# Patient Record
Sex: MALE | Race: WHITE | Employment: OTHER | URBAN - METROPOLITAN AREA
[De-identification: names, ages, dates, MRNs, and addresses within clinical notes are randomized per-mention and may not be internally consistent; named-entity substitution may affect disease eponyms.]

---

## 2018-05-15 ENCOUNTER — HOSPITAL ENCOUNTER (OUTPATIENT)
Dept: GENERAL RADIOLOGY | Age: 53
Discharge: HOME | End: 2018-05-15
Attending: PODIATRIST
Payer: MEDICARE

## 2018-05-15 DIAGNOSIS — S92.001K: ICD-10-CM

## 2018-05-15 PROCEDURE — 73610 X-RAY EXAM OF ANKLE: CPT

## 2019-06-28 ENCOUNTER — HOSPITAL ENCOUNTER (EMERGENCY)
Dept: GENERAL RADIOLOGY | Age: 54
Discharge: HOME | End: 2019-06-28
Attending: EMERGENCY MEDICINE
Payer: MEDICARE

## 2019-06-28 ENCOUNTER — HOSPITAL ENCOUNTER (EMERGENCY)
Age: 54
Discharge: HOME | End: 2019-06-28
Attending: STUDENT IN AN ORGANIZED HEALTH CARE EDUCATION/TRAINING PROGRAM
Payer: MEDICARE

## 2019-06-28 VITALS
HEART RATE: 110 BPM | DIASTOLIC BLOOD PRESSURE: 87 MMHG | SYSTOLIC BLOOD PRESSURE: 131 MMHG | RESPIRATION RATE: 20 BRPM | TEMPERATURE: 98.5 F

## 2019-06-28 DIAGNOSIS — M79.604 LEG PAIN, DIFFUSE, RIGHT: ICD-10-CM

## 2019-06-28 DIAGNOSIS — T14.8XXA ABRASION: Primary | ICD-10-CM

## 2019-06-28 PROCEDURE — 99281 EMR DPT VST MAYX REQ PHY/QHP: CPT

## 2019-06-28 PROCEDURE — 73562 X-RAY EXAM OF KNEE 3: CPT

## 2019-06-28 PROCEDURE — 73630 X-RAY EXAM OF FOOT: CPT

## 2019-06-28 PROCEDURE — 73502 X-RAY EXAM HIP UNI 2-3 VIEWS: CPT

## 2019-06-28 RX ORDER — IBUPROFEN 600 MG/1
600 TABLET ORAL
Status: DISCONTINUED | OUTPATIENT
Start: 2019-06-28 | End: 2019-06-28 | Stop reason: HOSPADM

## 2019-06-28 NOTE — DISCHARGE INSTRUCTIONS
Patient Education   Patient Education        Scrapes (Abrasions): Care Instructions  Your Care Instructions  Scrapes (abrasions) are wounds where your skin has been rubbed or torn off. Most scrapes do not go deep into the skin, but some may remove several layers of skin. Scrapes usually don't bleed much, but they may ooze pinkish fluid. Scrapes on the head or face may appear worse than they are. They may bleed a lot because of the good blood supply to this area. Most scrapes heal well and may not need a bandage. They usually heal within 3 to 7 days. A large, deep scrape may take 1 to 2 weeks or longer to heal. A scab may form on some scrapes. Follow-up care is a key part of your treatment and safety. Be sure to make and go to all appointments, and call your doctor if you are having problems. It's also a good idea to know your test results and keep a list of the medicines you take. How can you care for yourself at home? · If your doctor told you how to care for your wound, follow your doctor's instructions. If you did not get instructions, follow this general advice:  ? Wash the scrape with clean water 2 times a day. Don't use hydrogen peroxide or alcohol, which can slow healing. ? You may cover the scrape with a thin layer of petroleum jelly, such as Vaseline, and a nonstick bandage. ? Apply more petroleum jelly and replace the bandage as needed. · Prop up the injured area on a pillow anytime you sit or lie down during the next 3 days. Try to keep it above the level of your heart. This will help reduce swelling. · Be safe with medicines. Take pain medicines exactly as directed. ? If the doctor gave you a prescription medicine for pain, take it as prescribed. ? If you are not taking a prescription pain medicine, ask your doctor if you can take an over-the-counter medicine. When should you call for help?   Call your doctor now or seek immediate medical care if:    · You have signs of infection, such as:  ? Increased pain, swelling, warmth, or redness around the scrape. ? Red streaks leading from the scrape. ? Pus draining from the scrape. ? A fever.     · The scrape starts to bleed, and blood soaks through the bandage. Oozing small amounts of blood is normal.    Watch closely for changes in your health, and be sure to contact your doctor if the scrape is not getting better each day. Where can you learn more? Go to http://ronald-araceli.info/. Enter A374 in the search box to learn more about \"Scrapes (Abrasions): Care Instructions. \"  Current as of: September 23, 2018  Content Version: 11.9  © 4964-4310 [employer]. Care instructions adapted under license by [employer] (which disclaims liability or warranty for this information). If you have questions about a medical condition or this instruction, always ask your healthcare professional. [place] [address] any warranty or liability for your use of this information. Learning About Returning to Activity After Injury  What's important to know about injury recovery? Recovery from an injury takes time. Healing can take longer than you want it to. You may be tempted to return to your normal activities before you have fully healed. But stressing an injury makes it take even longer to heal. And you could make your injury worse than it's ever been. An injury heals fastest when you give it the rest and special care it needs. Your doctor can help you know when you're ready to ease back into normal activity. How can you help an injury heal?  You can speed up healing by avoiding movements that make it worse. It's also important to follow your doctor's instructions. · Ask your doctor if you can take an over-the-counter pain medicine, such as acetaminophen (Tylenol), ibuprofen (Advil, Motrin), or naproxen (Aleve). Be safe with medicines. Read and follow all instructions on the label.   · Put ice or a cold pack on the area for 10 to 20 minutes at a time to ease pain. Put a thin cloth between the ice and your skin. · If your doctor gave you a splint, a pair of crutches, or a sling, use it exactly as directed. Physical or occupational therapy can help you learn how to move in new ways and to recover from an injury. If you are given exercises to do, ease into them. Start each exercise slowly. Ease off an exercise if you start to have pain. When you have a routine of exercises, do them as often and as long as prescribed. While you heal, it also helps to keep the rest of your body moving. A physical therapist can suggest other exercises or ways of doing things to keep up your strength and energy. How do you return to activity? Slowly ease back into normal activity so you don't injure yourself again. A reinjury can be harder to heal than an original injury. If you are getting back into a sport, do it step by step. A  or physical therapist can help you do this safely. Start with short, easy movements or workouts. Then slowly add more over time. · Warm up before and stretch after the activity. · Stop what you're doing if it hurts. · When you're done, use ice to prevent pain and swelling. It may help to make some changes. For example, if a sport caused tendon pain, try another one for a while. If using a tool causes pain, change your  or use the other hand. When should you call for help? Watch closely for changes in your health, and be sure to contact your doctor if:  · Your symptoms are getting worse. · You have new symptoms, such as numbness or weakness. · You do not get better as expected. Follow-up care is a key part of your treatment and safety. Be sure to make and go to all appointments, and call your doctor if you are having problems. It's also a good idea to know your test results and keep a list of the medicines you take. Where can you learn more?   Go to http://ronald-araceli.info/. Enter B834 in the search box to learn more about \"Learning About Returning to Activity After Injury. \"  Current as of: September 20, 2018  Content Version: 11.9  © 5434-6835 [employer]. Care instructions adapted under license by [employer] (which disclaims liability or warranty for this information). If you have questions about a medical condition or this instruction, always ask your healthcare professional. [address] any warranty or liability for your use of this information.

## 2019-06-28 NOTE — ED TRIAGE NOTES
Had a fall today getting onto [place], he tripped at adult , has an abrasion to right knee then later at home refused to stand /bear weight. Patient is non-verbal, arrives with parents.

## 2019-06-28 NOTE — ED PROVIDER NOTES
62 y.o. male with no significant past medical history who presents from home with chief complaint of leg pain. Per relative, the pt was walking to go to an activity at school when he tripped getting into the [place] and fell onto his right knee. Pt has superficial scrapes to his right knee. He was then walking shortly after when he abruptly started refusing to ambulate. Relatives state that he now cries when they try to get him up to walk. They called his PCP who referred him to the ED for an x-ray. Pt had 2 Tylenol 1.5 hours ago. There are no other acute medical concerns at this time. Full history, physical exam, and ROS unable to be obtained due to:  nonverbal. 
 
PCP: Ellie Camarillo MD 
 
I have evaluated the patient as the Provider in Triage. I have reviewed His vital signs and the triage nurse assessment. I have talked with the patient and any available family and advised that I am the provider in triage and have ordered the appropriate study to initiate their work up based on the clinical presentation during my assessment. I have advised that the patient will be accommodated in the Main ED as soon as possible. I have also requested to contact the triage nurse or myself immediately if the patient experiences any changes in their condition during this brief waiting period. Note written by Rema Martínez, as dictated by Fritz Nichols MD 5:03 PM 
 
The history is provided by the patient and a parent. No  was used. No past medical history on file. No past surgical history on file. No family history on file. Social History Socioeconomic History  Marital status: SINGLE Spouse name: Not on file  Number of children: Not on file  Years of education: Not on file  Highest education level: Not on file Occupational History  Not on file Social Needs  Financial resource strain: Not on file  Food insecurity:  
  Worry: Not on file Inability: Not on file  Transportation needs:  
  Medical: Not on file Non-medical: Not on file Tobacco Use  Smoking status: Not on file Substance and Sexual Activity  Alcohol use: Not on file  Drug use: Not on file  Sexual activity: Not on file Lifestyle  Physical activity:  
  Days per week: Not on file Minutes per session: Not on file  Stress: Not on file Relationships  Social connections:  
  Talks on phone: Not on file Gets together: Not on file Attends Jain service: Not on file Active member of club or organization: Not on file Attends meetings of clubs or organizations: Not on file Relationship status: Not on file  Intimate partner violence:  
  Fear of current or ex partner: Not on file Emotionally abused: Not on file Physically abused: Not on file Forced sexual activity: Not on file Other Topics Concern  Not on file Social History Narrative  Not on file ALLERGIES: Patient has no allergy information on record. Review of Systems Unable to perform ROS: Patient nonverbal  
 
 
Vitals:  
 06/28/19 1705 BP: 131/87 Pulse: (!) 110 Resp: 20 Temp: 98.5 °F (36.9 °C) Physical Exam  
Constitutional: He is oriented to person, place, and time. He appears well-developed and well-nourished. No distress. NAD,  DD, non-verbal at baseline HENT:  
Head: Normocephalic and atraumatic. Mouth/Throat: Oropharynx is clear and moist. No oropharyngeal exudate. Eyes: Pupils are equal, round, and reactive to light. Conjunctivae and EOM are normal. Right eye exhibits no discharge. Left eye exhibits no discharge. Neck: Normal range of motion. Neck supple. Cardiovascular: Normal rate, regular rhythm, normal heart sounds and intact distal pulses. Exam reveals no gallop and no friction rub. No murmur heard.  
Pulmonary/Chest: Effort normal and breath sounds normal. No respiratory distress. He has no wheezes. He has no rales. He exhibits no tenderness. Abdominal: Soft. Bowel sounds are normal. He exhibits no distension and no mass. There is no tenderness. There is no rebound and no guarding. nttp Musculoskeletal: Normal range of motion. He exhibits no edema, tenderness or deformity. Abrasion to R knee noted Pt had central/ paraspinal C/T/L spines, Upper/Lower ext long bones, Abdomen,  Pelvis, hands /feet and all joints palpated and tolerated well (except as above) ; Pt has motor/ CV / Sensation grossly intact to all extremities; Lymphadenopathy:  
  He has no cervical adenopathy. Neurological: He is alert and oriented to person, place, and time. He displays normal reflexes. No cranial nerve deficit or sensory deficit. He exhibits normal muscle tone. Coordination normal.  
Skin: Skin is warm and dry. Capillary refill takes less than 2 seconds. No rash noted. No erythema. Nursing note and vitals reviewed. MDM Procedures 6:40 PM 
[facility] Getting  results have been reviewed with him. He has been counseled regarding his diagnosis. He verbally conveys understanding and agreement of the signs, symptoms, diagnosis, treatment and prognosis and additionally agrees to Call/ Arrange follow up as recommended with Dr. Aden Weller MD in 24 - 48 hours. He also agrees with the care-plan and conveys that all of his questions have been answered. I have also put together some discharge instructions for him that include: 1) educational information regarding their diagnosis, 2) how to care for their diagnosis at home, as well a 3) list of reasons why they would want to return to the ED prior to their follow-up appointment, should their condition change or for concerns.

## 2019-07-24 ENCOUNTER — HOSPITAL ENCOUNTER (OUTPATIENT)
Dept: MAMMOGRAPHY | Age: 54
Discharge: HOME | End: 2019-07-24
Attending: ORTHOPAEDIC SURGERY
Payer: MEDICARE

## 2019-07-24 ENCOUNTER — HOSPITAL ENCOUNTER (OUTPATIENT)
Dept: GENERAL RADIOLOGY | Age: 54
Discharge: HOME | End: 2019-07-24
Attending: ORTHOPAEDIC SURGERY
Payer: MEDICARE

## 2019-07-24 DIAGNOSIS — M54.50 LUMBAGO: ICD-10-CM

## 2019-07-24 DIAGNOSIS — M85.89 OTHER SPECIFIED DISORDERS OF BONE DENSITY AND STRUCTURE, MULTIPLE SITES: ICD-10-CM

## 2019-07-24 PROCEDURE — 72100 X-RAY EXAM L-S SPINE 2/3 VWS: CPT

## 2019-07-24 PROCEDURE — 77080 DXA BONE DENSITY AXIAL: CPT

## 2020-01-01 ENCOUNTER — APPOINTMENT (OUTPATIENT)
Dept: GENERAL RADIOLOGY | Age: 55
End: 2020-01-01
Attending: EMERGENCY MEDICINE
Payer: MEDICARE

## 2020-01-01 ENCOUNTER — HOSPITAL ENCOUNTER (EMERGENCY)
Age: 55
Discharge: HOME | End: 2020-10-15
Attending: EMERGENCY MEDICINE
Payer: MEDICARE

## 2020-01-01 ENCOUNTER — APPOINTMENT (OUTPATIENT)
Dept: CT IMAGING | Age: 55
End: 2020-01-01
Attending: EMERGENCY MEDICINE
Payer: MEDICARE

## 2020-01-01 ENCOUNTER — TRANSCRIBE ORDER (OUTPATIENT)
Dept: SCHEDULING | Age: 55
End: 2020-01-01

## 2020-01-01 ENCOUNTER — VIRTUAL VISIT (OUTPATIENT)
Dept: NEUROLOGY | Age: 55
End: 2020-01-01
Payer: MEDICARE

## 2020-01-01 ENCOUNTER — OFFICE VISIT (OUTPATIENT)
Dept: NEUROLOGY | Age: 55
End: 2020-01-01

## 2020-01-01 VITALS — TEMPERATURE: 97.9 F

## 2020-01-01 VITALS
OXYGEN SATURATION: 100 % | HEART RATE: 81 BPM | HEIGHT: 65 IN | BODY MASS INDEX: 19.66 KG/M2 | DIASTOLIC BLOOD PRESSURE: 73 MMHG | WEIGHT: 118 LBS | RESPIRATION RATE: 16 BRPM | SYSTOLIC BLOOD PRESSURE: 139 MMHG | TEMPERATURE: 98.4 F

## 2020-01-01 DIAGNOSIS — R41.82 ALTERED MENTAL STATUS, UNSPECIFIED ALTERED MENTAL STATUS TYPE: Primary | ICD-10-CM

## 2020-01-01 DIAGNOSIS — F03.90 DEMENTIA WITHOUT BEHAVIORAL DISTURBANCE, UNSPECIFIED DEMENTIA TYPE: ICD-10-CM

## 2020-01-01 DIAGNOSIS — F02.818 DEMENTIA IN ALZHEIMER'S DISEASE WITH EARLY ONSET WITH BEHAVIORAL DISTURBANCE (HCC): Primary | ICD-10-CM

## 2020-01-01 DIAGNOSIS — F81.89 DEVELOPMENTAL NON-VERBAL DISORDER: ICD-10-CM

## 2020-01-01 DIAGNOSIS — Q90.9 DOWN'S SYNDROME: ICD-10-CM

## 2020-01-01 DIAGNOSIS — R56.9 GENERALIZED-ONSET SEIZURES (HCC): Primary | ICD-10-CM

## 2020-01-01 DIAGNOSIS — G30.0 DEMENTIA IN ALZHEIMER'S DISEASE WITH EARLY ONSET WITH BEHAVIORAL DISTURBANCE (HCC): Primary | ICD-10-CM

## 2020-01-01 LAB
ALBUMIN SERPL-MCNC: 3.1 G/DL (ref 3.5–5)
ALBUMIN/GLOB SERPL: 0.7 {RATIO} (ref 1.1–2.2)
ALP SERPL-CCNC: 96 U/L (ref 45–117)
ALT SERPL-CCNC: 15 U/L (ref 12–78)
ANION GAP SERPL CALC-SCNC: 6 MMOL/L (ref 5–15)
APPEARANCE UR: CLEAR
AST SERPL-CCNC: 21 U/L (ref 15–37)
ATRIAL RATE: 468 BPM
BACTERIA SPEC CULT: NORMAL
BACTERIA URNS QL MICRO: NEGATIVE /HPF
BASOPHILS # BLD: 0 K/UL (ref 0–0.1)
BASOPHILS NFR BLD: 1 % (ref 0–1)
BILIRUB SERPL-MCNC: 0.5 MG/DL (ref 0.2–1)
BILIRUB UR QL CFM: NEGATIVE
BUN SERPL-MCNC: 14 MG/DL (ref 6–20)
BUN/CREAT SERPL: 15 (ref 12–20)
CALCIUM SERPL-MCNC: 8.5 MG/DL (ref 8.5–10.1)
CALCULATED R AXIS, ECG10: -2 DEGREES
CALCULATED T AXIS, ECG11: 17 DEGREES
CHLORIDE SERPL-SCNC: 107 MMOL/L (ref 97–108)
CO2 SERPL-SCNC: 28 MMOL/L (ref 21–32)
COLOR UR: ABNORMAL
COMMENT, HOLDF: NORMAL
CREAT SERPL-MCNC: 0.92 MG/DL (ref 0.7–1.3)
DIAGNOSIS, 93000: NORMAL
DIFFERENTIAL METHOD BLD: ABNORMAL
EOSINOPHIL # BLD: 0 K/UL (ref 0–0.4)
EOSINOPHIL NFR BLD: 0 % (ref 0–7)
EPITH CASTS URNS QL MICRO: ABNORMAL /LPF
ERYTHROCYTE [DISTWIDTH] IN BLOOD BY AUTOMATED COUNT: 13.5 % (ref 11.5–14.5)
GLOBULIN SER CALC-MCNC: 4.3 G/DL (ref 2–4)
GLUCOSE SERPL-MCNC: 92 MG/DL (ref 65–100)
GLUCOSE UR STRIP.AUTO-MCNC: NEGATIVE MG/DL
HCT VFR BLD AUTO: 40.8 % (ref 36.6–50.3)
HGB BLD-MCNC: 13.3 G/DL (ref 12.1–17)
HGB UR QL STRIP: NEGATIVE
HYALINE CASTS URNS QL MICRO: ABNORMAL /LPF (ref 0–5)
IMM GRANULOCYTES # BLD AUTO: 0 K/UL (ref 0–0.04)
IMM GRANULOCYTES NFR BLD AUTO: 0 % (ref 0–0.5)
KETONES UR QL STRIP.AUTO: 15 MG/DL
LACTATE BLD-SCNC: 1.21 MMOL/L (ref 0.4–2)
LEUKOCYTE ESTERASE UR QL STRIP.AUTO: NEGATIVE
LYMPHOCYTES # BLD: 2.6 K/UL (ref 0.8–3.5)
LYMPHOCYTES NFR BLD: 35 % (ref 12–49)
MCH RBC QN AUTO: 33.9 PG (ref 26–34)
MCHC RBC AUTO-ENTMCNC: 32.6 G/DL (ref 30–36.5)
MCV RBC AUTO: 104.1 FL (ref 80–99)
MONOCYTES # BLD: 0.6 K/UL (ref 0–1)
MONOCYTES NFR BLD: 8 % (ref 5–13)
NEUTS SEG # BLD: 4.2 K/UL (ref 1.8–8)
NEUTS SEG NFR BLD: 56 % (ref 32–75)
NITRITE UR QL STRIP.AUTO: NEGATIVE
NRBC # BLD: 0 K/UL (ref 0–0.01)
NRBC BLD-RTO: 0 PER 100 WBC
PH UR STRIP: 5.5 [PH] (ref 5–8)
PLATELET # BLD AUTO: 233 K/UL (ref 150–400)
PMV BLD AUTO: 10.8 FL (ref 8.9–12.9)
POTASSIUM SERPL-SCNC: 3.7 MMOL/L (ref 3.5–5.1)
PROCALCITONIN SERPL-MCNC: <0.05 NG/ML
PROT SERPL-MCNC: 7.4 G/DL (ref 6.4–8.2)
PROT UR STRIP-MCNC: NEGATIVE MG/DL
Q-T INTERVAL, ECG07: 378 MS
QRS DURATION, ECG06: 76 MS
QTC CALCULATION (BEZET), ECG08: 446 MS
RBC # BLD AUTO: 3.92 M/UL (ref 4.1–5.7)
RBC #/AREA URNS HPF: ABNORMAL /HPF (ref 0–5)
SAMPLES BEING HELD,HOLD: NORMAL
SERVICE CMNT-IMP: NORMAL
SODIUM SERPL-SCNC: 141 MMOL/L (ref 136–145)
SP GR UR REFRACTOMETRY: 1.03 (ref 1–1.03)
TROPONIN I SERPL-MCNC: <0.05 NG/ML
UA: UC IF INDICATED,UAUC: ABNORMAL
UR CULT HOLD, URHOLD: NORMAL
UROBILINOGEN UR QL STRIP.AUTO: 1 EU/DL (ref 0.2–1)
VENTRICULAR RATE, ECG03: 84 BPM
WBC # BLD AUTO: 7.4 K/UL (ref 4.1–11.1)
WBC URNS QL MICRO: ABNORMAL /HPF (ref 0–4)

## 2020-01-01 PROCEDURE — 90846 FAMILY PSYTX W/O PT 50 MIN: CPT | Performed by: CLINICAL NEUROPSYCHOLOGIST

## 2020-01-01 PROCEDURE — 71045 X-RAY EXAM CHEST 1 VIEW: CPT

## 2020-01-01 PROCEDURE — 99285 EMERGENCY DEPT VISIT HI MDM: CPT

## 2020-01-01 PROCEDURE — 80053 COMPREHEN METABOLIC PANEL: CPT

## 2020-01-01 PROCEDURE — 36415 COLL VENOUS BLD VENIPUNCTURE: CPT

## 2020-01-01 PROCEDURE — 84484 ASSAY OF TROPONIN QUANT: CPT

## 2020-01-01 PROCEDURE — 87040 BLOOD CULTURE FOR BACTERIA: CPT

## 2020-01-01 PROCEDURE — 84145 PROCALCITONIN (PCT): CPT

## 2020-01-01 PROCEDURE — 81001 URINALYSIS AUTO W/SCOPE: CPT

## 2020-01-01 PROCEDURE — 70450 CT HEAD/BRAIN W/O DYE: CPT

## 2020-01-01 PROCEDURE — 83605 ASSAY OF LACTIC ACID: CPT

## 2020-01-01 PROCEDURE — 74011250636 HC RX REV CODE- 250/636: Performed by: EMERGENCY MEDICINE

## 2020-01-01 PROCEDURE — 77030019905 HC CATH URETH INTMIT MDII -A

## 2020-01-01 PROCEDURE — 85025 COMPLETE CBC W/AUTO DIFF WBC: CPT

## 2020-01-01 PROCEDURE — 93005 ELECTROCARDIOGRAM TRACING: CPT

## 2020-01-01 RX ORDER — SODIUM CHLORIDE 0.9 % (FLUSH) 0.9 %
5-10 SYRINGE (ML) INJECTION AS NEEDED
Status: DISCONTINUED | OUTPATIENT
Start: 2020-01-01 | End: 2020-01-01 | Stop reason: HOSPADM

## 2020-01-01 RX ADMIN — SODIUM CHLORIDE 1000 ML: 900 INJECTION, SOLUTION INTRAVENOUS at 16:08

## 2020-04-07 ENCOUNTER — TELEPHONE (OUTPATIENT)
Dept: NEUROLOGY | Age: 55
End: 2020-04-07

## 2020-04-07 NOTE — TELEPHONE ENCOUNTER
----- Message from Danilo Shabazz sent at 4/7/2020  8:47 AM EDT -----  Regarding: /telephone  General Message/Vendor Calls    Caller's first and last name:      Reason for call: The pt's group home stated they would like the pt's NP appt rescheduled. The group home stated that they are willing to do a virtual appt. Callback required yes/no and why: yes      Best contact number(s): [phone].

## 2020-04-08 ENCOUNTER — OFFICE VISIT (OUTPATIENT)
Dept: NEUROLOGY | Age: 55
End: 2020-04-08

## 2020-04-08 DIAGNOSIS — G30.0 DEMENTIA IN ALZHEIMER'S DISEASE WITH EARLY ONSET WITH BEHAVIORAL DISTURBANCE (HCC): Primary | ICD-10-CM

## 2020-04-08 DIAGNOSIS — Q90.9 DOWN'S SYNDROME: ICD-10-CM

## 2020-04-08 DIAGNOSIS — F02.818 DEMENTIA IN ALZHEIMER'S DISEASE WITH EARLY ONSET WITH BEHAVIORAL DISTURBANCE (HCC): Primary | ICD-10-CM
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
## 2020-04-08 NOTE — LETTER
4/8/2020 11:35 AM 
 
Patient:  Moiz Ochoa YOB: 1965 Date of Visit: 4/8/2020 Dear Vito Belcher MD 
[address] [place] [id number] VIA Facsimile: [phone]: Thank you for referring Mr. Suzie Castellon to me for evaluation/treatment. I spoke with mother and  today and this is sounding quite a bit like dementia, which is common in this patient population. I would like to test him and mom would like to wait until the coronavirus issue settles a bit especially given the patient himself has just recovered from pneumonia. Please consider medication for memory in the interim and we will test him in approximately four to six weeks depending on the trend of the virus. I am still testing patients in the interim but do respect mother's wishes here, which make complete sense to me. If you have questions, please do not hesitate to call me. I look forward to following Mr. Merry Mckenzie along with you. Sincerely, Karol Dill PsyD

## 2020-07-23 NOTE — LETTER
7/24/20 Patient: Fiona John YOB: 1965 Date of Visit: 7/23/2020 Murali Cordero MD 
[address] [address] VIA Facsimile: [phone] Dear Murali Cordero MD, Thank you for referring Mr. Vasu Washington to [facility] for evaluation. My notes for this consultation are attached. If you have questions, please do not hesitate to call me. I look forward to following your patient along with you. Sincerely, Yanique Delgado PsyD
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
## 2020-09-08 NOTE — PROGRESS NOTES
This is a teleneuropsychology (audio/visual) visit that was performed with in the originating site at patient's home and the distance site at [facility] outpatient clinic at [place]. Verbal consent to participate in the video visit was obtained. This visit occurred during the corona (COVID -19) public health emergency and these visits were authorized by the President of the [place]. I discussed with the patient the nature of our teleneuropsych visit in that : 
 
- I would evaluate the patient and recommend diagnostics and treatment based on my assessment and impressions, and/or provided test results and discussed these issues with the patient and/or family. 
 
- Our sessions are not being recorded and that personal health information is protected - Our team will provide follow-up care in person if when the patient needs it. Prior to seeing the patient I reviewed the records, including the previously completed report, the records in [place], and any updated visits from other providers since I saw the patient last.   
 
Today, I engaged in a psychoeducational and supportive psychotherapy and feedback session with the patient via teleneuropsychology. I reviewed the results of the recent Neuropsychological Evaluation, including discussing individual tests as well as patient's areas of neurocognitive strength versus weakness. We discussed, in detail, the following: 
 
Testing limited by the patient's profound neurocognitive impairments. There is evidence of a decline in functioning from two separate sources and the prevalence of dementia is common in this patient population. From my observations and review, a dementia is quite likely. I do not have objective data with which to corroborate this, so this is a diagnosis of exclusion. Clinical correlation is strongly advised.   The patient should be encouraged to remain as mentally, socially, and physically active as possible.   
  
 The patient's generated cognitive difficulties are significant to the degree whereby it is my opinion that he should not live independently without appropriate supervision across virtually all ADLs, but especially those pertaining to memory. This includes nutritional/meal preparation supervision, medication management supervision, and supervision of financial dealings. I agree with his current placement. I do not find him competent to manage his own affairs. Follow up prn. Clinical correlation is,of course, indicated. Education was provided regarding my diagnostic impressions, and we discussed treatment plan/options. I also answered numerous questions related to the clinical findings, including discussing various methods to improve cognition and mood. Counseling provided regarding mood and cognition. CBT and supportive psychotherapy techniques were utilized. Supportive/Cognitive Behavioral/Solution Focused psychotherapy provided Discussed rational versus irrational thinking patterns and their consequences. Discussed healthy/adaptive and unhealthy/maladaptive coping. The patient needs to receive care for dementia and needs increased day-to-day care and support at home. Specific areas which were addressed include: progressive dementia, common in down's. He has had three seizures. Saw Dr. Shelia Joyce and last seizure was about 45 days ago. He is on seroquel. He is getting wors. e He doesn't recognize family. He will smile and look at the workers. It's getting worse. The patient had the following concerns which I deferred to their referring provider: meds Aricept caused seizures Time spent today:  45 in total 
 
Pursuant to the emergency declaration under the [facility], [facility] authority and the [employer] Act, this Virtual Visit (audio/visual) was conducted, with patient's consent, to reduce the patient's risk of exposure to COVID-19 and provide continuity of care. Services were provided in this manner to substitute for in-person clinic visit.

## 2020-10-15 NOTE — ED PROVIDER NOTES
Please note that this dictation was completed with [employer] ([employer]), the computer voice recognition software.  Quite often unanticipated grammatical, syntax, homophones, and other interpretive errors are inadvertently transcribed by the computer software.  Please disregard these errors.  Please excuse any errors that have escaped final proofreading. 40-year-old male past medical history remarkable for MR nonverbal at baseline, R collar bone fracture, dementia, and recent treatment with pneumonia 1 week ago presents by EMS from the group home for \"altered mental status. Patient's caregiver accompanies patient states for the past week he has been unable to feed himself which prior to last week he was able to do. They have also noticed changes in his activities his responsiveness. Today evidently at shift change they felt like he had changed more than during the previous week. He states normally he is up walking around which she has not been doing recently but today seemed worse. He denies any recent illnesses he had a negative Covid swab last week he has not been vomiting is not had diarrhea he is not been febrile he has not had any trauma. History is provided by the nurses assistant traveling with the patient. There have been no recet medication initiations/ changes/ discontinuations (save completed abx for CAP prescribed PCP/ completed 7 day course);  
 
assiatant denies recent HA, vison changes, diff swallowing, CP, SOB, Abd pain, F/Ch, N/V, D/Cons or other current systemic complaints other than above; No past medical history on file. No past medical history on file. No past surgical history on file. No family history on file. Social History Socioeconomic History  Marital status: SINGLE Spouse name: Not on file  Number of children: Not on file  Years of education: Not on file  Highest education level: Not on file Occupational History  Not on file Social Needs  Financial resource strain: Not on file  Food insecurity Worry: Not on file Inability: Not on file  Transportation needs Medical: Not on file Non-medical: Not on file Tobacco Use  Smoking status: Not on file Substance and Sexual Activity  Alcohol use: Not on file  Drug use: Not on file  Sexual activity: Not on file Lifestyle  Physical activity Days per week: Not on file Minutes per session: Not on file  Stress: Not on file Relationships  Social connections Talks on phone: Not on file Gets together: Not on file Attends Orthodoxy service: Not on file Active member of club or organization: Not on file Attends meetings of clubs or organizations: Not on file Relationship status: Not on file  Intimate partner violence Fear of current or ex partner: Not on file Emotionally abused: Not on file Physically abused: Not on file Forced sexual activity: Not on file Other Topics Concern  Not on file Social History Narrative  Not on file ALLERGIES: Reglan [metoclopramide hcl] Review of Systems Unable to perform ROS: Patient nonverbal  
All other systems reviewed and are negative. There were no vitals filed for this visit. Physical Exam 
Vitals signs and nursing note reviewed. Constitutional:   
   General: He is not in acute distress. Appearance: Normal appearance. He is well-developed. He is not ill-appearing, toxic-appearing or diaphoretic. Comments: NAD, at baseline mentally/ non-verbal; HENT:  
   Head: Normocephalic and atraumatic. Comments: Cn intact grossly Pt unable to fully participate Right Ear: External ear normal.  
   Left Ear: External ear normal.  
   Mouth/Throat:  
   Pharynx: No oropharyngeal exudate. Eyes:  
   General:     
   Right eye: No discharge. Left eye: No discharge.   
   Conjunctiva/sclera: Conjunctivae normal.  
 Pupils: Pupils are equal, round, and reactive to light. Neck: Musculoskeletal: Normal range of motion and neck supple. No muscular tenderness. Cardiovascular:  
   Rate and Rhythm: Normal rate and regular rhythm. Pulses: Normal pulses. Heart sounds: Normal heart sounds. No murmur. No friction rub. No gallop. Pulmonary:  
   Effort: Pulmonary effort is normal. No respiratory distress. Breath sounds: Normal breath sounds. No wheezing or rales. Chest:  
   Chest wall: No tenderness. Abdominal:  
   General: Bowel sounds are normal. There is no distension. Palpations: Abdomen is soft. There is no mass. Tenderness: There is no abdominal tenderness. There is no guarding or rebound. Musculoskeletal: Normal range of motion. General: No swelling, tenderness, deformity or signs of injury. Right lower leg: No edema. Left lower leg: No edema. Lymphadenopathy:  
   Cervical: No cervical adenopathy. Skin: 
   General: Skin is warm and dry. Capillary Refill: Capillary refill takes less than 2 seconds. Coloration: Skin is not jaundiced or pale. Findings: No bruising, erythema, lesion or rash. Neurological:  
   General: No focal deficit present. Mental Status: He is alert. Mental status is at baseline. Cranial Nerves: No cranial nerve deficit. Sensory: No sensory deficit. Motor: No weakness. Coordination: Coordination normal.  
   Gait: Gait normal.  
   Deep Tendon Reflexes: Reflexes normal.  
 
  
 
MDM Procedures No chief complaint on file. 3:34 PM 
The patients presenting problems have been discussed, and they are in agreement with the care plan formulated and outlined with them. I have encouraged them to ask questions as they arise throughout their visit. MEDICATIONS GIVEN: 
Medications - No data to display LABS REVIEWED: 
Labs Reviewed - No data to display RADIOLOGY RESULTS: 
 The following have been ordered and reviewed: 
_____________________________________________________________________ 
_____________________________________________________________________ EKG interpretation:  
Rhythm: normal sinus rhythm; and regular . Rate (approx.): 84; Axis: normal; P wave: normal; QRS interval: normal ; ST/T wave: normal; Negative acute significant segmental elevations/ no ols study PROCEDURES: 
 
 
 
CONSULTATIONS:  
 
 
PROGRESS NOTES: 
 
 
DIAGNOSIS: 
 
1. Altered mental status, unspecified altered mental status type 2. Dementia without behavioral disturbance, unspecified dementia type ([address] 3. Developmental non-verbal disorder PLAN: 
1-? Progression of dementia ED COURSE: The patients hospital course has been uncomplicated. 
 
4:49 PM Pt/ caregiver told of neg ed evaluation/ they agrtee w/ discharge home/ 'will see juan f Wilkinson';  
[employer]  results have been reviewed with him. He has been counseled regarding his diagnosis. He verbally conveys understanding and agreement of the signs, symptoms, diagnosis, treatment and prognosis and additionally agrees to Call/ Arrange follow up as recommended with Dr. Maria Antonia Olsen MD in 24 - 48 hours. He also agrees with the care-plan and conveys that all of his questions have been answered. I have also put together some discharge instructions for him that include: 1) educational information regarding their diagnosis, 2) how to care for their diagnosis at home, as well a 3) list of reasons why they would want to return to the ED prior to their follow-up appointment, should their condition change or for concerns.

## 2020-10-15 NOTE — ED NOTES
pts caregiver given d/c instructions and verbalized understanding to follow up with pcp. Pt to lobby via wheelchair

## 2020-10-15 NOTE — DISCHARGE INSTRUCTIONS
Patient Education        Dementia: Care Instructions  Your Care Instructions     Dementia is a loss of mental skills that affects your daily life. It is different than the occasional trouble with memory that is part of aging. You may find it hard to remember things that you feel you should be able to remember. Or you may feel that your mind is just not working as well as usual.  Finding out that you have dementia is a shock. You may be afraid and worried about how the condition will change your life. Although there is no cure at this time, medicine may slow memory loss and improve thinking for a while. Other medicines may be able to help you sleep or cope with depression and behavior changes. Dementia often gets worse slowly. But it can get worse quickly. As dementia gets worse, it may become harder to do common things that take planning, like making a list and going shopping. Over time, the disease may make it hard for you to take care of yourself. Some people with dementia need others to help care for them. Dementia is different for everyone. You may be able to function well for a long time. In the early stage of the condition, you can do things at home to make life easier and safer. You also can keep doing your hobbies and other activities. Many people find comfort in planning now for their future needs. Follow-up care is a key part of your treatment and safety. Be sure to make and go to all appointments, and call your doctor if you are having problems. It's also a good idea to know your test results and keep a list of the medicines you take. How can you care for yourself at home? · Take your medicines exactly as prescribed. Call your doctor if you think you are having a problem with your medicine. · Eat healthy foods. Eat lots of whole grains, fruits, and vegetables every day.  If you are not hungry, try snacks or nutritional drinks such as Boost, Ensure, or Sustacal.  · If you have problems sleeping:  ? Try not to nap too close to your bedtime. ? Exercise regularly. Walking is a good choice. ? Try a glass of warm milk or caffeine-free herbal tea before bed. · Do tasks and activities during the time of day when you feel your best. It may help to develop a daily routine. · Post labels, lists, and sticky notes to help you remember things. Write your activities on a calendar you can easily find. Put your clock where you can easily see it. · Stay active. Take walks in familiar places, or with friends or loved ones. Try to stay active mentally too. Read and work crossword puzzles if you enjoy these activities. · Do not drive unless you can pass an on-road driving test. If you are not sure if you are safe to drive, your state 's license bureau can test you. · Keep a cordless phone and a flashlight with new batteries by your bed. If possible, put a phone in each of the main rooms of your house, or carry a cell phone in case you fall and cannot reach a phone. Or, you can wear a device around your neck or wrist. You push a button that sends a signal for help. Acknowledge your emotions and plan for the future  · Talk openly and honestly with your doctor. · Let yourself grieve. It is common to feel angry, scared, frustrated, anxious, or depressed. · Get emotional support from family, friends, a support group, or a counselor experienced in working with people who have dementia. · Ask for help if you need it. · Tell your doctor how you feel. You may feel upset, angry, or worried at times. Many things can cause this, including poor sleep, medicine side effects, confusion, and pain. Your doctor may be able to help you. · Plan for the future. ? Talk to your family and doctor about preparing a living will and other important papers while you can make decisions. These papers tell your doctors how to care for you at the end of your life.   ? Consider naming a person to make decisions about your care if you are not able to. When should you call for help? Call 911 anytime you think you may need emergency care. For example, call if:    · You are lost and do not know whom to call.     · You are injured and do not know whom to call. Call your doctor now or seek immediate medical care if:    · You are more confused or upset than usual.     · You feel like you could hurt yourself because your mind is not working well. Watch closely for changes in your health, and be sure to contact your doctor if you have any problems. Where can you learn more? Go to http://www.gray.com/  Enter E814 in the search box to learn more about \"Dementia: Care Instructions. \"  Current as of: January 31, 2020               Content Version: 12.6  © 7520-8543 [employer]. Care instructions adapted under license by [employer] (which disclaims liability or warranty for this information). If you have questions about a medical condition or this instruction, always ask your healthcare professional. [place] [address] any warranty or liability for your use of this information. Patient Education        Altered Mental Status: Care Instructions  Your Care Instructions     Altered mental status is a change in how well your brain is working. As a result, you may be confused, be less alert than usual, or act in odd ways. This may include seeing or hearing things that aren't really there (hallucinations). A mental status change has many possible causes. For example, it may be the result of an infection, an imbalance of chemicals in the body, or a chronic disease such as diabetes or COPD. It can also be caused by things such as a head injury, taking certain medicines, or using alcohol or drugs. The doctor may do tests to look for the cause. These tests may include urine tests, blood tests, and imaging tests such as a CT scan. Sometimes a clear cause isn't found.  But tests can help the doctor rule out a serious cause of your symptoms. A change in mental status can be scary. But mental status will often return to normal when the cause is treated. So it is important to get any follow-up testing or treatment the doctor has suggested. The doctor has checked you carefully, but problems can develop later. If you notice any problems or new symptoms, get medical treatment right away. Follow-up care is a key part of your treatment and safety. Be sure to make and go to all appointments, and call your doctor if you are having problems. It's also a good idea to know your test results and keep a list of the medicines you take. How can you care for yourself at home? · Be safe with medicines. Take your medicines exactly as prescribed. Call your doctor if you think you are having a problem with your medicine. · Have another adult stay with you until you are better. This can help keep you safe. Ask that person to watch for signs that your mental status is getting worse. When should you call for help? Call 911 anytime you think you may need emergency care. For example, call if:    · You passed out (lost consciousness). Call your doctor now or seek immediate medical care if:    · Your mental status is getting worse.     · You have new symptoms, such as a fever, chills, or shortness of breath.     · You do not feel safe. Watch closely for changes in your health, and be sure to contact your doctor if:    · You do not get better as expected. Where can you learn more? Go to http://www.StyleTread.com/  Enter J452 in the search box to learn more about \"Altered Mental Status: Care Instructions. \"  Current as of: November 20, 2019               Content Version: 12.6  © 4497-6916 [employer]. Care instructions adapted under license by [employer] (which disclaims liability or warranty for this information).  If you have questions about a medical condition or this instruction, always ask your healthcare professional. [place] [address] any warranty or liability for your use of this information.

## 2020-10-15 NOTE — ED TRIAGE NOTES
Pt arrives via EMS from [facility] for AMS. EMS states that at around 1400 they noticed pt not acting like himself. Pt dx'd with PNA last week but recovered. (-) covid last week. Pt is non verbal at baseline but normally follows commands and acknowledges when name is called. Pt is agitated for EMS and pulling at IV.  Will not follow commands on arrival.

## 2021-01-01 ENCOUNTER — HOSPITAL ENCOUNTER (INPATIENT)
Age: 56
LOS: 1 days | Discharge: HOSPICE | DRG: 871 | End: 2021-05-05
Attending: EMERGENCY MEDICINE | Admitting: INTERNAL MEDICINE
Payer: MEDICARE

## 2021-01-01 ENCOUNTER — APPOINTMENT (OUTPATIENT)
Dept: GENERAL RADIOLOGY | Age: 56
End: 2021-01-01
Attending: EMERGENCY MEDICINE
Payer: MEDICARE

## 2021-01-01 ENCOUNTER — HOSPITAL ENCOUNTER (EMERGENCY)
Age: 56
Discharge: HOME | End: 2021-02-06
Attending: EMERGENCY MEDICINE | Admitting: EMERGENCY MEDICINE
Payer: MEDICARE

## 2021-01-01 ENCOUNTER — APPOINTMENT (OUTPATIENT)
Dept: CT IMAGING | Age: 56
DRG: 871 | End: 2021-01-01
Attending: EMERGENCY MEDICINE
Payer: MEDICARE

## 2021-01-01 ENCOUNTER — HOSPICE ADMISSION (OUTPATIENT)
Dept: HOSPICE | Facility: HOSPICE | Age: 56
End: 2021-01-01
Payer: MEDICARE

## 2021-01-01 ENCOUNTER — APPOINTMENT (OUTPATIENT)
Dept: GENERAL RADIOLOGY | Age: 56
DRG: 871 | End: 2021-01-01
Attending: EMERGENCY MEDICINE
Payer: MEDICARE

## 2021-01-01 ENCOUNTER — HOSPITAL ENCOUNTER (INPATIENT)
Age: 56
LOS: 1 days | DRG: 951 | End: 2021-05-05
Attending: INTERNAL MEDICINE | Admitting: INTERNAL MEDICINE
Payer: OTHER MISCELLANEOUS

## 2021-01-01 VITALS
HEART RATE: 93 BPM | SYSTOLIC BLOOD PRESSURE: 124 MMHG | OXYGEN SATURATION: 100 % | BODY MASS INDEX: 19.66 KG/M2 | RESPIRATION RATE: 18 BRPM | TEMPERATURE: 97.8 F | HEIGHT: 65 IN | WEIGHT: 118 LBS | DIASTOLIC BLOOD PRESSURE: 69 MMHG

## 2021-01-01 VITALS
TEMPERATURE: 99.7 F | OXYGEN SATURATION: 91 % | HEART RATE: 77 BPM | RESPIRATION RATE: 12 BRPM | WEIGHT: 123.2 LBS | SYSTOLIC BLOOD PRESSURE: 93 MMHG | DIASTOLIC BLOOD PRESSURE: 57 MMHG | BODY MASS INDEX: 20.5 KG/M2

## 2021-01-01 DIAGNOSIS — R62.7 FTT (FAILURE TO THRIVE) IN ADULT: ICD-10-CM

## 2021-01-01 DIAGNOSIS — G30.9 ALZHEIMER'S DEMENTIA WITHOUT BEHAVIORAL DISTURBANCE, UNSPECIFIED TIMING OF DEMENTIA ONSET: ICD-10-CM

## 2021-01-01 DIAGNOSIS — Z66 DNR (DO NOT RESUSCITATE): ICD-10-CM

## 2021-01-01 DIAGNOSIS — F02.80 ALZHEIMER'S DEMENTIA WITHOUT BEHAVIORAL DISTURBANCE, UNSPECIFIED TIMING OF DEMENTIA ONSET: ICD-10-CM

## 2021-01-01 DIAGNOSIS — R06.4 LABORED BREATHING: ICD-10-CM

## 2021-01-01 DIAGNOSIS — Z71.1 CONCERN ABOUT END OF LIFE: ICD-10-CM

## 2021-01-01 DIAGNOSIS — K11.7 INCREASED OROPHARYNGEAL SECRETIONS: ICD-10-CM

## 2021-01-01 DIAGNOSIS — G30.9 ALZHEIMER'S DEMENTIA WITH BEHAVIORAL DISTURBANCE, UNSPECIFIED TIMING OF DEMENTIA ONSET: ICD-10-CM

## 2021-01-01 DIAGNOSIS — F02.81 ALZHEIMER'S DEMENTIA WITH BEHAVIORAL DISTURBANCE, UNSPECIFIED TIMING OF DEMENTIA ONSET: ICD-10-CM

## 2021-01-01 DIAGNOSIS — Z71.89 GOALS OF CARE, COUNSELING/DISCUSSION: ICD-10-CM

## 2021-01-01 DIAGNOSIS — R41.82 ALTERED MENTAL STATUS, UNSPECIFIED ALTERED MENTAL STATUS TYPE: ICD-10-CM

## 2021-01-01 DIAGNOSIS — Q90.9 DOWN'S SYNDROME: ICD-10-CM

## 2021-01-01 DIAGNOSIS — Z51.5 HOSPICE CARE PATIENT: ICD-10-CM

## 2021-01-01 DIAGNOSIS — Z71.89 DNR (DO NOT RESUSCITATE) DISCUSSION: ICD-10-CM

## 2021-01-01 DIAGNOSIS — R56.9 SEIZURE (HCC): Primary | ICD-10-CM

## 2021-01-01 DIAGNOSIS — R41.89 DECREASED RESPONSIVENESS: ICD-10-CM

## 2021-01-01 DIAGNOSIS — R45.1 RESTLESSNESS AND AGITATION: ICD-10-CM

## 2021-01-01 DIAGNOSIS — Z71.89 ADVANCED CARE PLANNING/COUNSELING DISCUSSION: ICD-10-CM

## 2021-01-01 DIAGNOSIS — J18.9 PNEUMONIA OF RIGHT LUNG DUE TO INFECTIOUS ORGANISM, UNSPECIFIED PART OF LUNG: Primary | ICD-10-CM

## 2021-01-01 DIAGNOSIS — G40.909 SEIZURE DISORDER (HCC): ICD-10-CM

## 2021-01-01 DIAGNOSIS — R52 DIFFUSE PAIN: ICD-10-CM

## 2021-01-01 DIAGNOSIS — R09.02 HYPOXIA: ICD-10-CM

## 2021-01-01 DIAGNOSIS — F41.9 ANXIETY: ICD-10-CM

## 2021-01-01 LAB
ALBUMIN SERPL-MCNC: 3.2 G/DL (ref 3.5–5)
ALBUMIN SERPL-MCNC: 3.5 G/DL (ref 3.5–5)
ALBUMIN/GLOB SERPL: 0.8 {RATIO} (ref 1.1–2.2)
ALBUMIN/GLOB SERPL: 0.9 {RATIO} (ref 1.1–2.2)
ALP SERPL-CCNC: 112 U/L (ref 45–117)
ALP SERPL-CCNC: 89 U/L (ref 45–117)
ALT SERPL-CCNC: 17 U/L (ref 12–78)
ALT SERPL-CCNC: 19 U/L (ref 12–78)
ANION GAP SERPL CALC-SCNC: 5 MMOL/L (ref 5–15)
ANION GAP SERPL CALC-SCNC: 6 MMOL/L (ref 5–15)
APPEARANCE UR: CLEAR
AST SERPL-CCNC: 14 U/L (ref 15–37)
AST SERPL-CCNC: 20 U/L (ref 15–37)
ATRIAL RATE: 102 BPM
ATRIAL RATE: 74 BPM
BACTERIA SPEC CULT: NORMAL
BACTERIA SPEC CULT: NORMAL
BACTERIA URNS QL MICRO: NEGATIVE /HPF
BASOPHILS # BLD: 0 K/UL (ref 0–0.1)
BASOPHILS # BLD: 0.1 K/UL (ref 0–0.1)
BASOPHILS NFR BLD: 0 % (ref 0–1)
BASOPHILS NFR BLD: 1 % (ref 0–1)
BILIRUB SERPL-MCNC: 0.2 MG/DL (ref 0.2–1)
BILIRUB SERPL-MCNC: 0.4 MG/DL (ref 0.2–1)
BILIRUB UR QL: NEGATIVE
BUN SERPL-MCNC: 18 MG/DL (ref 6–20)
BUN SERPL-MCNC: 23 MG/DL (ref 6–20)
BUN/CREAT SERPL: 19 (ref 12–20)
BUN/CREAT SERPL: 19 (ref 12–20)
CALCIUM SERPL-MCNC: 8.8 MG/DL (ref 8.5–10.1)
CALCIUM SERPL-MCNC: 8.8 MG/DL (ref 8.5–10.1)
CALCULATED P AXIS, ECG09: 43 DEGREES
CALCULATED P AXIS, ECG09: 44 DEGREES
CALCULATED R AXIS, ECG10: 20 DEGREES
CALCULATED R AXIS, ECG10: 31 DEGREES
CALCULATED T AXIS, ECG11: 35 DEGREES
CALCULATED T AXIS, ECG11: 48 DEGREES
CHLORIDE SERPL-SCNC: 105 MMOL/L (ref 97–108)
CHLORIDE SERPL-SCNC: 108 MMOL/L (ref 97–108)
CO2 SERPL-SCNC: 28 MMOL/L (ref 21–32)
CO2 SERPL-SCNC: 30 MMOL/L (ref 21–32)
COLOR UR: ABNORMAL
COMMENT, HOLDF: NORMAL
COMMENT, HOLDF: NORMAL
COVID-19 RAPID TEST, COVR: NOT DETECTED
CREAT SERPL-MCNC: 0.96 MG/DL (ref 0.7–1.3)
CREAT SERPL-MCNC: 1.21 MG/DL (ref 0.7–1.3)
DIAGNOSIS, 93000: NORMAL
DIAGNOSIS, 93000: NORMAL
DIFFERENTIAL METHOD BLD: ABNORMAL
DIFFERENTIAL METHOD BLD: ABNORMAL
EOSINOPHIL # BLD: 0 K/UL (ref 0–0.4)
EOSINOPHIL # BLD: 0 K/UL (ref 0–0.4)
EOSINOPHIL NFR BLD: 0 % (ref 0–7)
EOSINOPHIL NFR BLD: 0 % (ref 0–7)
EPITH CASTS URNS QL MICRO: ABNORMAL /LPF
ERYTHROCYTE [DISTWIDTH] IN BLOOD BY AUTOMATED COUNT: 13.5 % (ref 11.5–14.5)
ERYTHROCYTE [DISTWIDTH] IN BLOOD BY AUTOMATED COUNT: 13.7 % (ref 11.5–14.5)
GLOBULIN SER CALC-MCNC: 3.4 G/DL (ref 2–4)
GLOBULIN SER CALC-MCNC: 4.4 G/DL (ref 2–4)
GLUCOSE SERPL-MCNC: 151 MG/DL (ref 65–100)
GLUCOSE SERPL-MCNC: 96 MG/DL (ref 65–100)
GLUCOSE UR STRIP.AUTO-MCNC: NEGATIVE MG/DL
HCT VFR BLD AUTO: 41.1 % (ref 36.6–50.3)
HCT VFR BLD AUTO: 44.5 % (ref 36.6–50.3)
HGB BLD-MCNC: 13.9 G/DL (ref 12.1–17)
HGB BLD-MCNC: 14.9 G/DL (ref 12.1–17)
HGB UR QL STRIP: ABNORMAL
IMM GRANULOCYTES # BLD AUTO: 0 K/UL (ref 0–0.04)
IMM GRANULOCYTES # BLD AUTO: 0.1 K/UL (ref 0–0.04)
IMM GRANULOCYTES NFR BLD AUTO: 0 % (ref 0–0.5)
IMM GRANULOCYTES NFR BLD AUTO: 1 % (ref 0–0.5)
KETONES UR QL STRIP.AUTO: NEGATIVE MG/DL
LACTATE BLD-SCNC: 0.75 MMOL/L (ref 0.4–2)
LACTATE BLD-SCNC: 0.8 MMOL/L (ref 0.4–2)
LACTATE BLD-SCNC: 2.55 MMOL/L (ref 0.4–2)
LEUKOCYTE ESTERASE UR QL STRIP.AUTO: NEGATIVE
LYMPHOCYTES # BLD: 2.4 K/UL (ref 0.8–3.5)
LYMPHOCYTES # BLD: 3.1 K/UL (ref 0.8–3.5)
LYMPHOCYTES NFR BLD: 27 % (ref 12–49)
LYMPHOCYTES NFR BLD: 35 % (ref 12–49)
MCH RBC QN AUTO: 34.7 PG (ref 26–34)
MCH RBC QN AUTO: 34.8 PG (ref 26–34)
MCHC RBC AUTO-ENTMCNC: 33.5 G/DL (ref 30–36.5)
MCHC RBC AUTO-ENTMCNC: 33.8 G/DL (ref 30–36.5)
MCV RBC AUTO: 102.8 FL (ref 80–99)
MCV RBC AUTO: 103.7 FL (ref 80–99)
MONOCYTES # BLD: 0.6 K/UL (ref 0–1)
MONOCYTES # BLD: 0.8 K/UL (ref 0–1)
MONOCYTES NFR BLD: 7 % (ref 5–13)
MONOCYTES NFR BLD: 9 % (ref 5–13)
NEUTS SEG # BLD: 5 K/UL (ref 1.8–8)
NEUTS SEG # BLD: 5.6 K/UL (ref 1.8–8)
NEUTS SEG NFR BLD: 57 % (ref 32–75)
NEUTS SEG NFR BLD: 63 % (ref 32–75)
NITRITE UR QL STRIP.AUTO: NEGATIVE
NRBC # BLD: 0 K/UL (ref 0–0.01)
NRBC # BLD: 0 K/UL (ref 0–0.01)
NRBC BLD-RTO: 0 PER 100 WBC
NRBC BLD-RTO: 0 PER 100 WBC
P-R INTERVAL, ECG05: 126 MS
P-R INTERVAL, ECG05: 136 MS
PH UR STRIP: 5.5 [PH] (ref 5–8)
PLATELET # BLD AUTO: 213 K/UL (ref 150–400)
PLATELET # BLD AUTO: 224 K/UL (ref 150–400)
PMV BLD AUTO: 9.8 FL (ref 8.9–12.9)
PMV BLD AUTO: 9.9 FL (ref 8.9–12.9)
POTASSIUM SERPL-SCNC: 3.7 MMOL/L (ref 3.5–5.1)
POTASSIUM SERPL-SCNC: 3.9 MMOL/L (ref 3.5–5.1)
PROT SERPL-MCNC: 6.6 G/DL (ref 6.4–8.2)
PROT SERPL-MCNC: 7.9 G/DL (ref 6.4–8.2)
PROT UR STRIP-MCNC: NEGATIVE MG/DL
Q-T INTERVAL, ECG07: 358 MS
Q-T INTERVAL, ECG07: 398 MS
QRS DURATION, ECG06: 72 MS
QRS DURATION, ECG06: 74 MS
QTC CALCULATION (BEZET), ECG08: 441 MS
QTC CALCULATION (BEZET), ECG08: 466 MS
RBC # BLD AUTO: 4 M/UL (ref 4.1–5.7)
RBC # BLD AUTO: 4.29 M/UL (ref 4.1–5.7)
RBC #/AREA URNS HPF: ABNORMAL /HPF (ref 0–5)
SAMPLES BEING HELD,HOLD: NORMAL
SAMPLES BEING HELD,HOLD: NORMAL
SARS-COV-2, COV2: NORMAL
SERVICE CMNT-IMP: NORMAL
SERVICE CMNT-IMP: NORMAL
SODIUM SERPL-SCNC: 141 MMOL/L (ref 136–145)
SODIUM SERPL-SCNC: 141 MMOL/L (ref 136–145)
SOURCE, COVRS: NORMAL
SP GR UR REFRACTOMETRY: <1.005 (ref 1–1.03)
TROPONIN I SERPL-MCNC: <0.05 NG/ML
TROPONIN I SERPL-MCNC: <0.05 NG/ML
UA: UC IF INDICATED,UAUC: ABNORMAL
UROBILINOGEN UR QL STRIP.AUTO: 0.2 EU/DL (ref 0.2–1)
VENTRICULAR RATE, ECG03: 102 BPM
VENTRICULAR RATE, ECG03: 74 BPM
WBC # BLD AUTO: 8.9 K/UL (ref 4.1–11.1)
WBC # BLD AUTO: 8.9 K/UL (ref 4.1–11.1)
WBC URNS QL MICRO: ABNORMAL /HPF (ref 0–4)

## 2021-01-01 PROCEDURE — 85025 COMPLETE CBC W/AUTO DIFF WBC: CPT

## 2021-01-01 PROCEDURE — 81001 URINALYSIS AUTO W/SCOPE: CPT

## 2021-01-01 PROCEDURE — 74011250637 HC RX REV CODE- 250/637: Performed by: EMERGENCY MEDICINE

## 2021-01-01 PROCEDURE — 84484 ASSAY OF TROPONIN QUANT: CPT

## 2021-01-01 PROCEDURE — 96361 HYDRATE IV INFUSION ADD-ON: CPT

## 2021-01-01 PROCEDURE — 74011250636 HC RX REV CODE- 250/636: Performed by: NURSE PRACTITIONER

## 2021-01-01 PROCEDURE — 99218 HC RM OBSERVATION: CPT

## 2021-01-01 PROCEDURE — 80053 COMPREHEN METABOLIC PANEL: CPT

## 2021-01-01 PROCEDURE — 87040 BLOOD CULTURE FOR BACTERIA: CPT

## 2021-01-01 PROCEDURE — 94760 N-INVAS EAR/PLS OXIMETRY 1: CPT

## 2021-01-01 PROCEDURE — 93005 ELECTROCARDIOGRAM TRACING: CPT

## 2021-01-01 PROCEDURE — 65270000029 HC RM PRIVATE

## 2021-01-01 PROCEDURE — 71045 X-RAY EXAM CHEST 1 VIEW: CPT

## 2021-01-01 PROCEDURE — 74011250636 HC RX REV CODE- 250/636: Performed by: EMERGENCY MEDICINE

## 2021-01-01 PROCEDURE — 99223 1ST HOSP IP/OBS HIGH 75: CPT | Performed by: INTERNAL MEDICINE

## 2021-01-01 PROCEDURE — 99285 EMERGENCY DEPT VISIT HI MDM: CPT

## 2021-01-01 PROCEDURE — 96372 THER/PROPH/DIAG INJ SC/IM: CPT

## 2021-01-01 PROCEDURE — 36415 COLL VENOUS BLD VENIPUNCTURE: CPT

## 2021-01-01 PROCEDURE — 77030040922 HC BLNKT HYPOTHRM STRY -A

## 2021-01-01 PROCEDURE — 74011000258 HC RX REV CODE- 258: Performed by: INTERNAL MEDICINE

## 2021-01-01 PROCEDURE — 77010033678 HC OXYGEN DAILY

## 2021-01-01 PROCEDURE — 74011250636 HC RX REV CODE- 250/636: Performed by: INTERNAL MEDICINE

## 2021-01-01 PROCEDURE — 96374 THER/PROPH/DIAG INJ IV PUSH: CPT

## 2021-01-01 PROCEDURE — 83605 ASSAY OF LACTIC ACID: CPT

## 2021-01-01 PROCEDURE — 0656 HSPC GENERAL INPATIENT

## 2021-01-01 PROCEDURE — 87635 SARS-COV-2 COVID-19 AMP PRB: CPT

## 2021-01-01 PROCEDURE — 3336500001 HSPC ELECTION

## 2021-01-01 PROCEDURE — 77030027138 HC INCENT SPIROMETER -A

## 2021-01-01 PROCEDURE — 71250 CT THORAX DX C-: CPT

## 2021-01-01 PROCEDURE — 74011000250 HC RX REV CODE- 250: Performed by: INTERNAL MEDICINE

## 2021-01-01 PROCEDURE — 99223 1ST HOSP IP/OBS HIGH 75: CPT | Performed by: NURSE PRACTITIONER

## 2021-01-01 PROCEDURE — 70450 CT HEAD/BRAIN W/O DYE: CPT

## 2021-01-01 RX ORDER — LAMOTRIGINE 25 MG/1
50 TABLET, ORALLY DISINTEGRATING ORAL EVERY EVENING
Status: DISCONTINUED | OUTPATIENT
Start: 2021-01-01 | End: 2021-01-01

## 2021-01-01 RX ORDER — KETOROLAC TROMETHAMINE 30 MG/ML
30 INJECTION, SOLUTION INTRAMUSCULAR; INTRAVENOUS
Status: DISCONTINUED | OUTPATIENT
Start: 2021-01-01 | End: 2021-01-01 | Stop reason: HOSPADM

## 2021-01-01 RX ORDER — LAMOTRIGINE 25 MG/1
25 TABLET ORAL DAILY
Status: DISCONTINUED | OUTPATIENT
Start: 2021-01-01 | End: 2021-01-01

## 2021-01-01 RX ORDER — ZINC SULFATE 50(220)MG
220 CAPSULE ORAL DAILY
Qty: 30 CAP | Refills: 0 | Status: SHIPPED | OUTPATIENT
Start: 2021-01-01 | End: 2021-01-01

## 2021-01-01 RX ORDER — GLYCOPYRROLATE 0.2 MG/ML
0.2 INJECTION INTRAMUSCULAR; INTRAVENOUS EVERY 4 HOURS
Status: DISCONTINUED | OUTPATIENT
Start: 2021-01-01 | End: 2021-05-06 | Stop reason: HOSPADM

## 2021-01-01 RX ORDER — CHOLECALCIFEROL (VITAMIN D3) 125 MCG
2000 CAPSULE ORAL DAILY
COMMUNITY
End: 2021-01-01

## 2021-01-01 RX ORDER — LORAZEPAM 1 MG/1
1 TABLET ORAL
COMMUNITY

## 2021-01-01 RX ORDER — KETOROLAC TROMETHAMINE 30 MG/ML
30 INJECTION, SOLUTION INTRAMUSCULAR; INTRAVENOUS
Status: DISCONTINUED | OUTPATIENT
Start: 2021-01-01 | End: 2021-05-06 | Stop reason: HOSPADM

## 2021-01-01 RX ORDER — HALOPERIDOL 5 MG/ML
0.5 INJECTION INTRAMUSCULAR
Status: DISCONTINUED | OUTPATIENT
Start: 2021-01-01 | End: 2021-05-06 | Stop reason: HOSPADM

## 2021-01-01 RX ORDER — ACETAMINOPHEN 325 MG/1
650 TABLET ORAL
Status: DISCONTINUED | OUTPATIENT
Start: 2021-01-01 | End: 2021-01-01 | Stop reason: HOSPADM

## 2021-01-01 RX ORDER — MORPHINE SULFATE 2 MG/ML
1 INJECTION, SOLUTION INTRAMUSCULAR; INTRAVENOUS
Status: DISCONTINUED | OUTPATIENT
Start: 2021-01-01 | End: 2021-01-01 | Stop reason: HOSPADM

## 2021-01-01 RX ORDER — LORAZEPAM 2 MG/ML
1 INJECTION INTRAMUSCULAR
Status: COMPLETED | OUTPATIENT
Start: 2021-01-01 | End: 2021-01-01

## 2021-01-01 RX ORDER — OMEPRAZOLE 20 MG/1
20 CAPSULE, DELAYED RELEASE ORAL 2 TIMES DAILY
COMMUNITY

## 2021-01-01 RX ORDER — LAMOTRIGINE 50 MG/1
50 TABLET, ORALLY DISINTEGRATING ORAL
COMMUNITY

## 2021-01-01 RX ORDER — LAMOTRIGINE 25 MG/1
25 TABLET ORAL DAILY
COMMUNITY

## 2021-01-01 RX ORDER — SODIUM CHLORIDE 0.9 % (FLUSH) 0.9 %
5-10 SYRINGE (ML) INJECTION AS NEEDED
Status: DISCONTINUED | OUTPATIENT
Start: 2021-01-01 | End: 2021-01-01 | Stop reason: HOSPADM

## 2021-01-01 RX ORDER — LORAZEPAM 2 MG/ML
1 INJECTION INTRAMUSCULAR
Status: DISCONTINUED | OUTPATIENT
Start: 2021-01-01 | End: 2021-01-01 | Stop reason: HOSPADM

## 2021-01-01 RX ORDER — SODIUM CHLORIDE 0.9 % (FLUSH) 0.9 %
5-40 SYRINGE (ML) INJECTION AS NEEDED
Status: DISCONTINUED | OUTPATIENT
Start: 2021-01-01 | End: 2021-01-01 | Stop reason: HOSPADM

## 2021-01-01 RX ORDER — LORAZEPAM 2 MG/ML
1 INJECTION INTRAMUSCULAR
Status: DISCONTINUED | OUTPATIENT
Start: 2021-01-01 | End: 2021-01-01

## 2021-01-01 RX ORDER — SODIUM CHLORIDE 0.9 % (FLUSH) 0.9 %
5-40 SYRINGE (ML) INJECTION EVERY 8 HOURS
Status: DISCONTINUED | OUTPATIENT
Start: 2021-01-01 | End: 2021-01-01

## 2021-01-01 RX ORDER — ALBUTEROL SULFATE 90 UG/1
2 AEROSOL, METERED RESPIRATORY (INHALATION) ONCE
Status: COMPLETED | OUTPATIENT
Start: 2021-01-01 | End: 2021-01-01

## 2021-01-01 RX ORDER — DOXYCYCLINE HYCLATE 100 MG
100 TABLET ORAL 2 TIMES DAILY
Qty: 20 TAB | Refills: 0 | Status: SHIPPED | OUTPATIENT
Start: 2021-01-01 | End: 2021-01-01

## 2021-01-01 RX ORDER — LORAZEPAM 2 MG/ML
1 INJECTION INTRAMUSCULAR EVERY 8 HOURS
Status: DISCONTINUED | OUTPATIENT
Start: 2021-01-01 | End: 2021-01-01 | Stop reason: HOSPADM

## 2021-01-01 RX ORDER — ASCORBIC ACID 500 MG
500 TABLET ORAL 2 TIMES DAILY
Qty: 60 TAB | Refills: 0 | Status: SHIPPED | OUTPATIENT
Start: 2021-01-01 | End: 2021-01-01

## 2021-01-01 RX ORDER — GLYCOPYRROLATE 0.2 MG/ML
0.2 INJECTION INTRAMUSCULAR; INTRAVENOUS
Status: DISCONTINUED | OUTPATIENT
Start: 2021-01-01 | End: 2021-01-01 | Stop reason: HOSPADM

## 2021-01-01 RX ORDER — LORAZEPAM 2 MG/ML
1 INJECTION INTRAMUSCULAR
Status: DISCONTINUED | OUTPATIENT
Start: 2021-01-01 | End: 2021-05-06 | Stop reason: HOSPADM

## 2021-01-01 RX ORDER — ACETAMINOPHEN 650 MG/1
650 SUPPOSITORY RECTAL
Status: DISCONTINUED | OUTPATIENT
Start: 2021-01-01 | End: 2021-01-01 | Stop reason: HOSPADM

## 2021-01-01 RX ORDER — ONDANSETRON 2 MG/ML
4 INJECTION INTRAMUSCULAR; INTRAVENOUS
Status: DISCONTINUED | OUTPATIENT
Start: 2021-01-01 | End: 2021-01-01 | Stop reason: HOSPADM

## 2021-01-01 RX ORDER — LORAZEPAM 2 MG/ML
2 INJECTION INTRAMUSCULAR
Status: DISCONTINUED | OUTPATIENT
Start: 2021-01-01 | End: 2021-05-06 | Stop reason: HOSPADM

## 2021-01-01 RX ORDER — ONDANSETRON 2 MG/ML
4 INJECTION INTRAMUSCULAR; INTRAVENOUS
Status: DISCONTINUED | OUTPATIENT
Start: 2021-01-01 | End: 2021-05-06 | Stop reason: HOSPADM

## 2021-01-01 RX ORDER — LORAZEPAM 2 MG/ML
1 INJECTION INTRAMUSCULAR EVERY 4 HOURS
Status: DISCONTINUED | OUTPATIENT
Start: 2021-01-01 | End: 2021-05-06 | Stop reason: HOSPADM

## 2021-01-01 RX ORDER — POLYETHYLENE GLYCOL 3350 17 G/17G
17 POWDER, FOR SOLUTION ORAL DAILY PRN
Status: DISCONTINUED | OUTPATIENT
Start: 2021-01-01 | End: 2021-01-01

## 2021-01-01 RX ORDER — LEVOTHYROXINE SODIUM 50 UG/1
50 TABLET ORAL DAILY
COMMUNITY
End: 2021-01-01

## 2021-01-01 RX ORDER — MORPHINE SULFATE 2 MG/ML
2 INJECTION, SOLUTION INTRAMUSCULAR; INTRAVENOUS
Status: DISCONTINUED | OUTPATIENT
Start: 2021-01-01 | End: 2021-05-06 | Stop reason: HOSPADM

## 2021-01-01 RX ORDER — ENOXAPARIN SODIUM 100 MG/ML
40 INJECTION SUBCUTANEOUS DAILY
Status: DISCONTINUED | OUTPATIENT
Start: 2021-01-01 | End: 2021-01-01

## 2021-01-01 RX ORDER — PROMETHAZINE HYDROCHLORIDE 25 MG/1
12.5 TABLET ORAL
Status: DISCONTINUED | OUTPATIENT
Start: 2021-01-01 | End: 2021-01-01

## 2021-01-01 RX ORDER — MORPHINE SULFATE 2 MG/ML
2 INJECTION, SOLUTION INTRAMUSCULAR; INTRAVENOUS EVERY 4 HOURS
Status: DISCONTINUED | OUTPATIENT
Start: 2021-01-01 | End: 2021-05-06 | Stop reason: HOSPADM

## 2021-01-01 RX ORDER — FACIAL-BODY WIPES
10 EACH TOPICAL DAILY PRN
Status: DISCONTINUED | OUTPATIENT
Start: 2021-01-01 | End: 2021-05-06 | Stop reason: HOSPADM

## 2021-01-01 RX ORDER — LORAZEPAM 2 MG/ML
2 INJECTION INTRAMUSCULAR
Status: DISCONTINUED | OUTPATIENT
Start: 2021-01-01 | End: 2021-01-01 | Stop reason: HOSPADM

## 2021-01-01 RX ADMIN — LORAZEPAM 2 MG: 2 INJECTION INTRAMUSCULAR; INTRAVENOUS at 10:48

## 2021-01-01 RX ADMIN — PIPERACILLIN AND TAZOBACTAM 3.38 G: 3; .375 INJECTION, POWDER, LYOPHILIZED, FOR SOLUTION INTRAVENOUS at 11:07

## 2021-01-01 RX ADMIN — LORAZEPAM 1 MG: 2 INJECTION INTRAMUSCULAR; INTRAVENOUS at 13:18

## 2021-01-01 RX ADMIN — SODIUM CHLORIDE 1000 ML: 9 INJECTION, SOLUTION INTRAVENOUS at 14:35

## 2021-01-01 RX ADMIN — LORAZEPAM 1 MG: 2 INJECTION INTRAMUSCULAR; INTRAVENOUS at 05:13

## 2021-01-01 RX ADMIN — LORAZEPAM 1 MG: 2 INJECTION INTRAMUSCULAR; INTRAVENOUS at 20:31

## 2021-01-01 RX ADMIN — GLYCOPYRROLATE 0.2 MG: 0.2 INJECTION INTRAMUSCULAR; INTRAVENOUS at 20:31

## 2021-01-01 RX ADMIN — LORAZEPAM 1 MG: 2 INJECTION INTRAMUSCULAR; INTRAVENOUS at 21:08

## 2021-01-01 RX ADMIN — LORAZEPAM 1 MG: 2 INJECTION INTRAMUSCULAR; INTRAVENOUS at 15:49

## 2021-01-01 RX ADMIN — MORPHINE SULFATE 2 MG: 2 INJECTION, SOLUTION INTRAMUSCULAR; INTRAVENOUS at 20:32

## 2021-01-01 RX ADMIN — MORPHINE SULFATE 2 MG: 2 INJECTION, SOLUTION INTRAMUSCULAR; INTRAVENOUS at 16:27

## 2021-01-01 RX ADMIN — LORAZEPAM 1 MG: 2 INJECTION INTRAMUSCULAR; INTRAVENOUS at 02:40

## 2021-01-01 RX ADMIN — GLYCOPYRROLATE 0.2 MG: 0.2 INJECTION INTRAMUSCULAR; INTRAVENOUS at 13:15

## 2021-01-01 RX ADMIN — GLYCOPYRROLATE 0.2 MG: 0.2 INJECTION INTRAMUSCULAR; INTRAVENOUS at 15:49

## 2021-01-01 RX ADMIN — ENOXAPARIN SODIUM 40 MG: 40 INJECTION SUBCUTANEOUS at 08:31

## 2021-01-01 RX ADMIN — SODIUM CHLORIDE 1000 ML: 9 INJECTION, SOLUTION INTRAVENOUS at 02:38

## 2021-01-01 RX ADMIN — ALBUTEROL SULFATE 2 PUFF: 108 INHALANT RESPIRATORY (INHALATION) at 18:43

## 2021-01-01 RX ADMIN — LORAZEPAM 1 MG: 2 INJECTION INTRAMUSCULAR; INTRAVENOUS at 14:48

## 2021-02-06 NOTE — ED PROVIDER NOTES
The history is provided by a caregiver. The history is limited by a developmental delay. No  was used. Nasal Congestion This is a new problem. The current episode started 2 days ago. The problem occurs constantly. The problem has not changed since onset. No past medical history on file. No past surgical history on file. No family history on file. Social History Socioeconomic History  Marital status: SINGLE Spouse name: Not on file  Number of children: Not on file  Years of education: Not on file  Highest education level: Not on file Occupational History  Not on file Social Needs  Financial resource strain: Not on file  Food insecurity Worry: Not on file Inability: Not on file  Transportation needs Medical: Not on file Non-medical: Not on file Tobacco Use  Smoking status: Not on file Substance and Sexual Activity  Alcohol use: Not on file  Drug use: Not on file  Sexual activity: Not on file Lifestyle  Physical activity Days per week: Not on file Minutes per session: Not on file  Stress: Not on file Relationships  Social connections Talks on phone: Not on file Gets together: Not on file Attends Faith service: Not on file Active member of club or organization: Not on file Attends meetings of clubs or organizations: Not on file Relationship status: Not on file  Intimate partner violence Fear of current or ex partner: Not on file Emotionally abused: Not on file Physically abused: Not on file Forced sexual activity: Not on file Other Topics Concern  Not on file Social History Narrative  Not on file ALLERGIES: Aricept [donepezil] and Reglan [metoclopramide hcl] Review of Systems Unable to perform ROS: Patient nonverbal  
 
 
Vitals:  
 02/06/21 1316 Pulse: (!) 106 Resp: 18 Temp: 97.8 °F (36.6 °C) SpO2: (!) 88% Weight: 53.5 kg (118 lb) Height: 5' 5\" (1.651 m) Physical Exam 
Vitals signs and nursing note reviewed. Constitutional:   
   General: He is not in acute distress. Appearance: He is well-developed. He is not diaphoretic. HENT:  
   Head: Normocephalic and atraumatic. Nose: Congestion present. Eyes:  
   Pupils: Pupils are equal, round, and reactive to light. Neck: Musculoskeletal: Normal range of motion and neck supple. Cardiovascular:  
   Rate and Rhythm: Normal rate and regular rhythm. Heart sounds: Normal heart sounds. No murmur. No friction rub. No gallop. Pulmonary:  
   Effort: Pulmonary effort is normal. No respiratory distress. Breath sounds: Normal breath sounds. Decreased air movement present. No wheezing. Abdominal:  
   General: Bowel sounds are normal. There is no distension. Palpations: Abdomen is soft. Tenderness: There is no abdominal tenderness. There is no guarding or rebound. Musculoskeletal: Normal range of motion. Skin: 
   General: Skin is warm. Findings: No rash. Neurological:  
   Mental Status: He is alert.   
 
  
 
MDM 
  
 This is a 15-year-old male with past medical history, review of systems, physical exam as above, presenting with complaints of congestion and upper respiratory infection. Caregiver states unknown time. Likely several days of URI-like symptoms, \"feels warm\" in his group home. History of early onset Alzheimer's disease, patient's nonverbal at baseline. Caregiver denies coronavirus outbreak at the group home. She states they were at primary care physician's office earlier today where the patient was noted to be hypoxic, with decreased breath sounds on the left. Caregiver states that patient has a history of previous pneumonia. Physical exam remarkable for chronically ill-appearing middle-aged male, in no acute distress, noted to be satting 97% on room air, with diminished breath sounds throughout, regular rate and rhythm without murmurs gallops or rubs, copious nasal congestion, soft nontender abdomen. Differential includes pneumonia, viral URI, nasal congestion. Given reports of tachycardia and hypoxia will begin sepsis work-up with fluid resuscitation, chest x-ray, Covid swab. We will reassess, and make a disposition. Procedures SIGN OUT: 
3:00 PM 
Discussed pt's hx, disposition, and available diagnostic and imaging results with Dr. Susannah Diaz. Reviewed care plans. Both providers and patient are in agreement with care plan. Dr. Deandre Jacobo is transferring care of the pt to Dr. Susannah Diaz at this time.

## 2021-02-06 NOTE — ED NOTES
Report received from [facility], [address] [place] [address]. Assumed care of pt at this time

## 2021-02-06 NOTE — ED TRIAGE NOTES
Pt arrives with caretaker Araceli Salguero) for further evaluation of suspected pneumonia. Pt was seen at PCP office and referred here for xray. Pt is non-verbal; SPO2 sats in triage was 88%. PMH of pneumonia.

## 2021-02-06 NOTE — PROGRESS NOTES
5:40 PM 
Change of shift. Care of patient taken over from Dr Karyle Dare; H&P reviewed, bedside handoff complete. Awaiting imaging and blood work. Hypoxia noted in triage which was not noted in the room. Plan is for discharge home and pt looks well. Likely covid and will treat accordingly. Results noted. Will treat with abx for pna and pulse ox provided to patient. Will dc and strict RTER precautions provided.   
 
Suzanna Almendarez MD

## 2021-05-04 PROBLEM — R56.9 SEIZURE (HCC): Status: ACTIVE | Noted: 2021-01-01

## 2021-05-04 PROBLEM — A41.9 SEPSIS (HCC): Status: ACTIVE | Noted: 2021-01-01

## 2021-05-04 PROBLEM — T68.XXXA HYPOTHERMIA: Status: ACTIVE | Noted: 2021-01-01

## 2021-05-04 PROBLEM — R00.1 BRADYCARDIA: Status: ACTIVE | Noted: 2021-01-01

## 2021-05-04 PROBLEM — R57.9 SHOCK (HCC): Status: ACTIVE | Noted: 2021-01-01

## 2021-05-04 NOTE — HOSPICE
[facility]  [phone]     Patient Name: Kyaw Nuñez  YOB: 1965  Age: 60 y.o. [facility] RN Note:  Hospice consult received, reviewing chart. Will follow up with Unit Nurse and Care Manager to discuss plan of care, patient status and discharge disposition    Patient seen in the ED for GIP evaluation, patient unresponsive but comfortable. O2 via nasal cannula at 2L. Breathing unlabored, even. RR12-14  No audible secretions noted. No signs of pain, agitation or restlessness. BP on  Monitor 93/50, HR 64, sp02 97%  Currently on trace hugger. No seizure activity noted. D/W case w/ MD Dr Mario Ricardo. At this time, patient does not meet criteria for inpatient hospice     Patient is appropriate for hospice under routine LOC at previous residence. Per chart review, patient has been admitted to the medical team under Dr. Regla Davis. Should patient remain stable and plan to return to group home w/ hospice,  has noted that group home would like [facility] to serve. Given patient's history and recent seizure activity PTA, will continue to follow and reassess tomorrow AM for changes that would warrant inpatient hospice admission. Thank you for the opportunity to be of service to this patient.

## 2021-05-04 NOTE — ED PROVIDER NOTES
Please note that this dictation was completed with [employer], the computer voice recognition software.  Quite often unanticipated grammatical, syntax, homophones, and other interpretive errors are inadvertently transcribed by the computer software.  Please disregard these errors.  Please excuse any errors that have escaped final proofreading. 61-year-old male past medical history markable for developmental delay (MR) dementia presents by EMS from the group home for \"he had a seizure earlier today and then we found him and he seems altered. EMS reports that the group home was very unclear as to what exactly the altered mental status was and what the patient's normal baseline is. Per EMS they said \"on a good day he is up and walking around but he has not been doing that for a while. \"  Per the group home he has not been sick recently does have a history of seizures. They were unclear as to exactly how long he had been like this or when his last known normal time was. EMS reports blood glucose was 137 in route; No past medical history on file. No past surgical history on file. No family history on file.     Social History     Socioeconomic History    Marital status: SINGLE     Spouse name: Not on file    Number of children: Not on file    Years of education: Not on file    Highest education level: Not on file   Occupational History    Not on file   Social Needs    Financial resource strain: Not on file    Food insecurity     Worry: Not on file     Inability: Not on file    Transportation needs     Medical: Not on file     Non-medical: Not on file   Tobacco Use    Smoking status: Not on file   Substance and Sexual Activity    Alcohol use: Not on file    Drug use: Not on file    Sexual activity: Not on file   Lifestyle    Physical activity     Days per week: Not on file     Minutes per session: Not on file    Stress: Not on file   Relationships    Social connections     Talks on phone: Not on file     Gets together: Not on file     Attends [employer] service: Not on file     Active member of club or organization: Not on file     Attends meetings of clubs or organizations: Not on file     Relationship status: Not on file    Intimate partner violence     Fear of current or ex partner: Not on file     Emotionally abused: Not on file     Physically abused: Not on file     Forced sexual activity: Not on file   Other Topics Concern    Not on file   Social History Narrative    Not on file         ALLERGIES: Aricept [donepezil] and Reglan [metoclopramide hcl]    Review of Systems   Unable to perform ROS: Dementia   All other systems reviewed and are negative. Vitals:    05/04/21 0214   BP: 117/64   Pulse: 77   Resp: 20   Temp: 97.5 °F (36.4 °C)   SpO2: 96%   Weight: 55.9 kg (123 lb 3.2 oz)            Physical Exam  Constitutional:       General: He is not in acute distress. Appearance: Normal appearance. He is normal weight. He is not ill-appearing, toxic-appearing or diaphoretic. Comments: On NC   HENT:      Head: Normocephalic and atraumatic. Comments: Gag intact         Right Ear: External ear normal.      Left Ear: External ear normal.      Nose: Nose normal.      Mouth/Throat:      Mouth: Mucous membranes are moist.      Pharynx: No oropharyngeal exudate or posterior oropharyngeal erythema. Eyes:      General:         Right eye: No discharge. Left eye: No discharge. Pupils: Pupils are equal, round, and reactive to light. Comments: L eye medially deviated;    Neck:      Musculoskeletal: Normal range of motion. No muscular tenderness. Cardiovascular:      Rate and Rhythm: Normal rate and regular rhythm. Pulses: Normal pulses. Heart sounds: Normal heart sounds. No murmur. No gallop. Pulmonary:      Effort: Pulmonary effort is normal. No respiratory distress. Breath sounds: Normal breath sounds. No stridor. No wheezing, rhonchi or rales. Chest:      Chest wall: No tenderness. Genitourinary:     Comments: In diaper    NEMG/ circumcised   Musculoskeletal:         General: No deformity or signs of injury. Comments: Pt had central/ paraspinal C/T/L spines, Upper/Lower ext long bones, Abdomen,  Pelvis, bilateral snuff boxes/ bilateral snuff boxes/ hands /feet and all joints palpated and tolerated well (except as above) ; Pt has motor/ CV / Sensation grossly intact to all extremities;   Skin:     Capillary Refill: Capillary refill takes less than 2 seconds. Coloration: Skin is not jaundiced. Findings: No erythema or rash. Neurological:      Mental Status: Mental status is at baseline. Coordination: Coordination abnormal.          MDM  Number of Diagnoses or Management Options  Risk of Complications, Morbidity, and/or Mortality  Presenting problems: high  Diagnostic procedures: moderate  Management options: moderate  General comments: Total critical care time spent exclusive of procedures:  55 min    Patient Progress  Patient progress: improved         Procedures    Chief Complaint   Patient presents with    Unresponsive       2:19 AM  The patients presenting problems have been discussed, and they are in agreement with the care plan formulated and outlined with them. I have encouraged them to ask questions as they arise throughout their visit.     MEDICATIONS GIVEN:  Medications   sodium chloride (NS) flush 5-10 mL (has no administration in time range)   sodium chloride 0.9 % bolus infusion 1,000 mL (has no administration in time range)       LABS REVIEWED:  Labs Reviewed   CULTURE, BLOOD   CULTURE, BLOOD   URINALYSIS W/ REFLEX CULTURE   METABOLIC PANEL, COMPREHENSIVE   CBC WITH AUTOMATED DIFF   TROPONIN I       RADIOLOGY RESULTS:  The following have been ordered and reviewed:  _____________________________________________________________________  _____________________________________________________________________    EKG interpretation:   Rhythm: normal sinus rhythm; and regular . Rate (approx.): 74; Axis: normal; P wave: normal; QRS interval: normal ; ST/T wave: normal; Negative acute significant segmental elevations/ compared to study dated 02/06/2021    PROCEDURES:        CONSULTATIONS:       PROGRESS NOTES:      DIAGNOSIS:    1. Seizure ([facility]    2. Alzheimer's dementia with behavioral disturbance, unspecified timing of dementia onset ([facility]    3. DNR (do not resuscitate)        PLAN:  1-admit and monitor; Neurology evaluation for seizure medication      ED COURSE: The patients hospital course has been uncomplicated. 2:19 AM  Called spoke with Román Lan (mother) [phone]; 'He has not been sick/ does have seizures, on a seizure medicine (unknown)';       2:24 AM  Spoke with / 'Pt is DNR/ takes lorazepam for seizures/ had a seizure at 10 pm';      4:52 AM  Discussed results w/ Aide;     Perfect Serve Consult for Admission  5:19 AM    ED Room Number: ER06/06  Patient Name and age:  Luther Brown 60 y.o.  male  Working Diagnosis:   1. Seizure ([facility]    2. Alzheimer's dementia with behavioral disturbance, unspecified [facility] of [facility] ([facility]    3.  DNR (do not resuscitate)        COVID-19 Suspicion:  no  Sepsis present:  no  Reassessment needed: yes  Code Status:  Do Not Resuscitate  Readmission: no  Isolation Requirements:  no  Recommended Level of Care:  telemetry  Department:[facility] ED - [phone]  Other:  Seizure this evening/ sent for further evaluation from group home;  demented at baseline

## 2021-05-04 NOTE — PROGRESS NOTES
Shift Change:    Bedside and Verbal shift change report given to MEGHAN Thompson (oncoming nurse) by Arely Garcia RN (offgoing nurse). Report included the following information SBAR, Kardex, Intake/Output, MAR, Recent Results and Cardiac Rhythm Sinus urban. 0900: Unable to obtain oral or axillary temp. Patient's rectal temp 95.6. Patient skin cold to touch and pale. He is shaking, but stops when holding his arms. Covered patient with several warm blankets. Notified Dr. Gemma Smith. Message read but no response. 7146: Trace hugger applied on patient. Dr. Gemma Smith rounding     1010: Dr. Gemma Smith aware patient is hypotensive and bradycardic. No new orders at this time    1025: BP 78/48. Notified Dr. Gemma Smith again. Also made Dr. Gemma Smith aware that PO Lamictal was held due to patient only responds to noxious stimuli. 1100: Able to obtain axillary temp, 96.7. Since applying trace hugger patient has not been shaking, skin is warm, and he appears comfortable. 1133: Robinson, NP with palliative is transitioning patient to comfort measures. Orders to follow . Patient's mother updated on condition and planning to come to hospital around [phone] 03: Patient's family at bedside and palliative np gave update. TRANSFER - OUT REPORT:    Verbal report given to Merline Macdonald RN(name) on Jonel Parnell  being transferred to 5th floor - medical(unit) for routine progression of care       Report consisted of patients Situation, Background, Assessment and   Recommendations(SBAR). Information from the following report(s) SBAR, Kardex, Intake/Output, MAR, Recent Results and Cardiac Rhythm NSR was reviewed with the receiving nurse.     Lines:   Peripheral IV 05/04/21 Left Forearm (Active)   Site Assessment Clean, dry, & intact 05/04/21 0800   Phlebitis Assessment 0 05/04/21 0800   Infiltration Assessment 0 05/04/21 0800   Dressing Status Clean, dry, & intact 05/04/21 0800   Dressing Type Transparent;Tape 05/04/21 0800   Hub Color/Line Status Capped 05/04/21 0800   Alcohol Cap Used Yes 05/04/21 0800       Peripheral IV 05/04/21 Right Hand (Active)   Site Assessment Clean, dry, & intact 05/04/21 0800   Phlebitis Assessment 0 05/04/21 0800   Infiltration Assessment 0 05/04/21 0800   Dressing Status Clean, dry, & intact 05/04/21 0800   Dressing Type Transparent;Tape 05/04/21 0800   Hub Color/Line Status Capped 05/04/21 0800   Alcohol Cap Used Yes 05/04/21 0800        Opportunity for questions and clarification was provided.       Patient transported with:   Qbaka

## 2021-05-04 NOTE — CONSULTS
[facility]: [phone] ([phone])    Patient Name: Buzz Gordon  YOB: 1965    Date of Initial Consult: 5/4/2021  Reason for Consult: [address] discussion  Requesting Provider: Dr. Afshin Mcdermott   Primary Care Physician: Rodríguez Ray MD     SUMMARY:   Buzz Gordon is a 60 y.o. with a past history of Downs Syndrome, Intellectual disabilities, Alzheimer's Dementia and seizures. Patient presented to ER w/ facility staff reporting patient had seizure earlier in the day and now not as alert. They also reported he has had a functional decline over past 6 months, limited standing and now longer feeding self. Patient was admitted on  5/4/2021 from [facility] a diagnosis of Seizures and Acute Hypoxic Respiratory failure     Current medical issues leading to Palliative Medicine involvement include: GOC Discussion      Psychosocial Hx: born and raised in [place]. Has lived with his parents up until 2 years ago, when he moved into the [facility]. He has 3 siblings, who all live in [place]. Patient participated in various adult work programs. He is described as being a very happy and kind person, who loved dogs, particularly yorkies. PALLIATIVE DIAGNOSES:   1. Concern about end of life  2. Altered Mental status, unspecified  3. Debility  4. Hypoxia  5. Seizures  6. Advanced Care Planning discussion  7. Goals of Care discussion  8. DNR Discussion       PLAN:   1. Prior to visit I spoke with patients nurse Eugene Short RN  2. Initially, I met with patient, no family at bedside. Patient unresponsive to voice or touch, unable to participate in discussion. 3. Called patients mother and father and they were able to come and meet with me at bedside and we discussed teams concerns related to AMS, hypothermia, hypotension, bradycardia and hypoxia. 4. Hypoxia- unresolved, continues to require 4L NC. Patient does not use supplemental O2 at home.  CY chest + mild dependent bilat lung opacities, atelectasis vs aspiration. Patient also with fluid filled esophagus. 5. Altered Mental status, unspecified- Unresolved. Patient has had a cognitive decline over the past couple of years, was dx with Dementia, but he had still been able to communicate a couple of weeks ago. 6. Goals of care Discussion- Patients parents aware that he may be at end of life and they are extremely clear, that they do NOT want to prolong the dying process, they just want him to be kept comfortable. ·  I spoke with attending, Dr. Ike Lassiter, he placed Hospice consult, but  plans to keep patient overnight, see if he is stable enough  to be transported back to [facility] tomorrow. ·  Unit YUSUF Quinn had already contacted another area Hospice that the [facility] uses, should patient be discharged tomorrow. However, I also spoke [facility], she evaluated him for GIP, but he was not found to be appropriate at this time, but may re assess  tomorrow. · I called parents back later in day, provided update, they plan to visit the patient again tomorrow. 7. Comfort Care Only- per patients parent, comfort care only. 8. Seizures- Controlled? No witnessed seizures since presenting to Hospital. Patient had order for oral lamictal. However he is unresponsive and unable to take oral medications. I placed order for Lorazepam 1mg IV every 8 hours scheduled, with order for Lorazepam 2mg IV every 5 minutes as needed for uncontrolled seizures. · If patient discharges back to facility tomorrow, will convert to Lorazepam SL.  9. Patient at end of life, comfort orders for symptoms management placed. Please contact me via [employer] [facility] [employer] if any uncontrolled symptoms,  questions or concerns.     10. Advanced Care Planning Discussion- Patient does have an AMD and it appears that he signed document and designated his mother as his primary health care decision maker and his father as his secondary health decision maker. 11. DNR Discussion- Patient has a DNR code status. His parents are very clear no CPR and No intubation. He does not have a Durable DNR in EMR. If patient is discharged tomorrow,  will have parents complete DDNR. 12. Palliative team will follow up tomorrow      13. Initial consult note routed to primary continuity provider and/or primary health care team members    15. Communicated plan of care with: Palliative IDT, [facility] Team, Jay RN, unit CM Raj Grace, Dr. Sherry Eid,  Corrine Chavarria, Hospice liaison [place]. GOALS OF CARE / TREATMENT PREFERENCES:     GOALS OF CARE:  COMFORT CARE ONLY       TREATMENT PREFERENCES:   Code Status: DNR    Advance Care Planning:  [x] The [facility] Interdisciplinary Team has updated the ACP Navigator with Health Care Decision Maker and Patient Capacity      Primary Decision Maker: Elias Flores - Mother, Dayan Files - [phone]    Secondary Decision Maker: Donnie Allen - Father - [phone]  Advance Care Planning 5/4/2021   Patient's [address] is: Named in scanned ACP document   Confirm Advance Directive Yes, on file             Other Instructions: Other:    As far as possible, the palliative care team has discussed with patient / health care proxy about goals of care / treatment preferences for patient. HISTORY:     History obtained from: medical records/nurse/patients parents    CHIEF COMPLAINT: N/A    HPI/SUBJECTIVE:    The patient is:   [] Verbal and participatory  [x] Non-participatory due to:   Patient is unresponsive to voice and gentle touch, non verbal and not following commands    Clinical Pain Assessment (nonverbal scale for severity on nonverbal patients):          Activity (Movement): Lying quietly, normal position    Duration: for how long has pt been experiencing pain (e.g., 2 days, 1 month, years)  Frequency: how often pain is an issue (e.g., several times per day, once every few days, constant)     FUNCTIONAL ASSESSMENT:     Palliative Performance Scale (PPS): 10          PSYCHOSOCIAL/SPIRITUAL SCREENING:     Palliative IDT has assessed this patient for cultural preferences / practices and a referral made as appropriate to needs (Cultural Services, Patient Advocacy, Ethics, etc.)    Any spiritual / Shinto concerns:  [] Yes /  [x] No    Caregiver Burnout:  [] Yes /  [x] No /  [] No Caregiver Present      Anticipatory grief assessment:   [x] Normal  / [] Maladaptive       ESAS Anxiety:   0  ESAS Depression:  0        REVIEW OF SYSTEMS:     Positive and pertinent negative findings in ROS are noted above in HPI. The following systems were [] reviewed / [x] unable to be reviewed as noted in HPI  Other findings are noted below. Systems: constitutional, ears/nose/mouth/throat, respiratory, gastrointestinal, genitourinary, musculoskeletal, integumentary, neurologic, psychiatric, endocrine. Positive findings noted below. Modified ESAS Completed by: provider                                            PHYSICAL EXAM:     From RN flowsheet:  Wt Readings from Last 3 Encounters:   05/04/21 123 lb 3.2 oz (55.9 kg)   02/06/21 118 lb (53.5 kg)   10/15/20 118 lb (53.5 kg)     Blood pressure (!) 81/49, pulse (!) 57, temperature (!) 96.7 °F (35.9 °C), resp. rate 11, weight 123 lb 3.2 oz (55.9 kg), SpO2 96 %.     Pain Scale 1: Adult Nonverbal Pain Scale                    Last bowel movement, if known:     Constitutional: Sleeping, does not wake to voice, Appears comfortable, no signs of distress, adequately nourished, well kept  Eyes: pupils equal, anicteric  ENMT: no nasal discharge, dry  mucous membranes  Cardiovascular: regular rhythm, distal pulses intact, bradycardic  Respiratory: breathing not labored, symmetric, shallow  Gastrointestinal: soft non-tender, +bowel sounds  Musculoskeletal: no deformity, no tenderness to palpation  Skin: warm, dry  Neurologic: unresponsive to voice or touch, not following commands  Psychiatric: full affect, no hallucinations  Other:       HISTORY:     Active Problems:    Seizure (HCC) (5/4/2021)      Underweight ()      Hypothyroid ()      GERD (gastroesophageal reflux disease) ()      FTT (failure to thrive) in adult ()      Shock ([facility] (5/4/2021)      [facility] (5/4/2021)      Hypothermia (5/4/2021)      Bradycardia (5/4/2021)      Past Medical History:   Diagnosis Date    Dementia ([facility]     Down's syndrome     FTT (failure to thrive) in adult     GERD (gastroesophageal reflux disease)     Hypothyroid     Underweight       No past surgical history on file. No family history on file. History reviewed, no pertinent family history.   Social History     Tobacco Use    Smoking status: Not on file   Substance Use Topics    Alcohol use: Not on file     Allergies   Allergen Reactions    Aricept [Donepezil] Seizures    Reglan [Metoclopramide Hcl] Other (comments)     \"psychotic\"       Current Facility-Administered Medications   Medication Dose Route Frequency    sodium chloride (NS) flush 5-10 mL  5-10 mL IntraVENous PRN    sodium chloride (NS) flush 5-40 mL  5-40 mL IntraVENous Q8H    sodium chloride (NS) flush 5-40 mL  5-40 mL IntraVENous PRN    acetaminophen (TYLENOL) tablet 650 mg  650 mg Oral Q6H PRN    Or    acetaminophen (TYLENOL) suppository 650 mg  650 mg Rectal Q6H PRN    polyethylene glycol (MIRALAX) packet 17 g  17 g Oral DAILY PRN    promethazine (PHENERGAN) tablet 12.5 mg  12.5 mg Oral Q6H PRN    Or    ondansetron (ZOFRAN) injection 4 mg  4 mg IntraVENous Q6H PRN    enoxaparin (LOVENOX) injection 40 mg  40 mg SubCUTAneous DAILY    piperacillin-tazobactam (ZOSYN) 3.375 g in 0.9% sodium chloride (MBP/ADV) 100 mL MBP  3.375 g IntraVENous ONCE    Followed by    piperacillin-tazobactam (ZOSYN) 3.375 g in 0.9% sodium chloride (MBP/ADV) 100 mL MBP  3.375 g IntraVENous Q8H    lamoTRIgine (LaMICtal) tablet 25 mg  25 mg Oral DAILY    lamoTRIgine (LaMICtal) disintegrating tablet 50 mg   50 mg Oral QPM    LORazepam (ATIVAN) injection 1 mg  1 mg IntraVENous Q15MIN PRN    morphine injection 1 mg  1 mg IntraVENous Q15MIN PRN     Current Outpatient Medications   Medication Sig    cholecalciferol, vitamin D3, (Vitamin D3) 50 mcg (2,000 unit) tab Take 2,000 Units by mouth daily.  lamoTRIgine (LaMICtal) 25 mg tablet Take 25 mg by mouth daily.  lamoTRIgine (LaMICtal) 50 mg disintegrating tablet Take 50 mg by mouth nightly.  levothyroxine (SYNTHROID) 50 mcg tablet Take 50 mcg by mouth daily.  omeprazole (PRILOSEC) 20 mg capsule Take 20 mg by mouth two (2) times a day.  LORazepam (ATIVAN) 1 mg tablet Take 1 mg by mouth daily as needed for Anxiety. LAB AND IMAGING FINDINGS:     Lab Results   Component Value Date/Time    WBC 8.9 05/04/2021 02:36 AM    HGB 13.9 05/04/2021 02:36 AM    PLATELET 480 26/86/7513 02:36 AM     Lab Results   Component Value Date/Time    Sodium 141 05/04/2021 02:36 AM    Potassium 3.7 05/04/2021 02:36 AM    Chloride 108 05/04/2021 02:36 AM    CO2 28 05/04/2021 02:36 AM    BUN 18 05/04/2021 02:36 AM    Creatinine 0.96 05/04/2021 02:36 AM    Calcium 8.8 05/04/2021 02:36 AM      Lab Results   Component Value Date/Time    Alk. phosphatase 89 05/04/2021 02:36 AM    Protein, total 6.6 05/04/2021 02:36 AM    Albumin 3.2 (L) 05/04/2021 02:36 AM    Globulin 3.4 05/04/2021 02:36 AM     No results found for: INR, PTMR, PTP, PT1, PT2, APTT, INREXT   No results found for: IRON, FE, TIBC, IBCT, PSAT, FERR   No results found for: PH, PCO2, PO2  No components found for: GLPOC   No results found for: CPK, CKMB             Total time: 70 min  Counseling / coordination time, spent as noted above: 60 min  > 50% counseling / coordination?: yes    Prolonged service was provided for  []30 min   []75 min in face to face time in the presence of the patient, spent as noted above.   Time Start:   Time End:   Note: this can only be billed with 05491 (initial) or 49414 (follow up). If multiple start / stop times, list each separately.

## 2021-05-04 NOTE — PROGRESS NOTES
Spiritual Care Assessment/Progress Note  [address]      NAME: Ritu Feliz      MRN: [id number]  AGE: 60 y.o. SEX: male  Gnosticism Affiliation: No Confucianism   Language: English     5/4/2021     Total Time (in minutes): 18     Spiritual Assessment begun in [facility] EMERGENCY DEPT through conversation with:         [x]Patient        [] Family    [] Friend(s)        Reason for Consult: Palliative Care, Initial/Spiritual Assessment     Spiritual beliefs: (Please include comment if needed)     [x] Identifies with a vini tradition: Pentecostal     [] Supported by a vini community:            [] Claims no spiritual orientation:           [] Seeking spiritual identity:                [] Adheres to an individual form of spirituality:           [] Not able to assess:                           Identified resources for coping:      [] Prayer                               [] Music                  [] Guided Imagery     [x] Family/friends                 [] Pet visits     [] Devotional reading                         [] Unknown     [] Other:                                              Interventions offered during this visit: (See comments for more details)    Patient Interventions:  Other (comment)(Presence)     Family/Friend(s): Iconic (affirming the presence of God/Higher Power), End of life issues discussed, Normalization of emotional/spiritual concerns, Initial Assessment, Gnosticism beliefs/image of God discussed, Affirmation of emotions/emotional suffering, Affirmation of vini, Integration of medical assessment with existing values and beliefs, Coping skills reviewed/reinforced, Catharsis/review of pertinent events in supportive environment, Prayer (assurance of), Life review/legacy     Plan of Care:     [] Support spiritual and/or cultural needs    [] Support AMD and/or advance care planning process      [] Support grieving process   [] Coordinate Rites and/or Rituals    [] Coordination with community clergy [] No spiritual needs identified at this time   [] Detailed Plan of Care below (See Comments)  [] Make referral to Music Therapy  [] Make referral to Pet Therapy     [] Make referral to Addiction services  [] Make referral to [facility]  [] Make referral to Spiritual Care Partner  [] No future visits requested        [x] Follow up upon further referrals     Comments:   responded to page in the ER, and visited pt, Mr. Khloe Weller, for palliative care initial spiritual assessment. Pt was sedated and appeared to be sleeping. His parent were present by his bedside. They welcomed  warmly and engaged in conversation. They shared their thoughts and feelings about pt's current condition. Pt's mother stated they are gratful for having Brent for these many years,  because of the khoa he brings them. She indicated awareness that pt may not have a long time to live but they were at peace, knowing he will be in a better place, free of all sufferings. They are people of vini but do not go to [employer] currently.  offered active listening and affirmed thoughts and emotions. They expressed appreciation for the visit, stating that 's listening presence was very comforting. Spiritual care is still available for support upon request or staff referrals. Visited by: Michael Hanson.    Paging Service: [phone])

## 2021-05-04 NOTE — PROGRESS NOTES
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  Resolved. Consulted neurology. Continue lamictal. Mother think this is causing excessive lethargy, and would consider another agent. She is realistic that he is near death and wants to focus on comfort. Aspiration PNA - POA, presumed, due to sz. Check MRSA, started Zosyn, but at mother's request we have converted to comfort measures and stopped Abx. Sepsis / Hypothermia / Bradycardia / Shock - Worsening after admit. May be due to PNA, vs overall condition. Discussed at length with his mother, YOLANDA. No agressive measures. DNR. No pressors or central line. Hospice consult. FTT (failure to thrive) in adult / Underweight - worsening over years. Nutrition support when eating. Mother would like him to be at [address] [place] [address], will DC on hospice tomorrow    Hypothyroid - hold homeopathic dose    GERD (gastroesophageal reflux disease) - PPI for comfort       Subjective:     Chief Complaint:  somnolent    ROS:  (bold if positive, if negative)    Not Tolerating PT  Not Tolerating Diet        Objective:     Last 24hrs VS reviewed since prior progress note.  Most recent are:    Visit Vitals  BP (!) 83/49   Pulse (!) 54   Temp (!) 95.6 °F (35.3 °C)   Resp 10   Wt 55.9 kg (123 lb 3.2 oz)   SpO2 99%   BMI 20.50 kg/m²     SpO2 Readings from Last 6 Encounters:   21 99%   21 100%   10/15/20 100%    O2 Flow Rate (L/min): 2 l/min       Intake/Output Summary (Last 24 hours) at 2021 1019  Last data filed at 2021 0800  Gross per 24 hour   Intake 0 ml   Output    Net 0 ml        Physical Exam:    Gen:  Frai, thin ill appearing, in no acute distress  HEENT:  Pink conjunctivae, PERRL, hearing intact to voice, moist mucous membranes  Neck:  Supple, without masses, thyroid non-tender  Resp:  No accessory muscle use, clear breath sounds without wheezes rales or rhonchi  Card:  No murmurs, normal S1, S2 without thrills, bruits or peripheral edema  Abd:  Soft, non-tender, non-distended, normoactive bowel sounds are present, no mass  Lymph:  No cervical or inguinal adenopathy  Musc:  No cyanosis or clubbing  Skin:  No rashes or ulcers, skin turgor is reduced  Neuro:  Cranial nerves are grossly intact, genera motor weakness, does not follow commands   Psych:  somnolent    Telemetry reviewed:   normal sinus rhythm  __________________________________________________________________  Medications Reviewed: (see below)  Medications:     Current Facility-Administered Medications   Medication Dose Route Frequency    sodium chloride (NS) flush 5-10 mL  5-10 mL IntraVENous PRN    sodium chloride (NS) flush 5-40 mL  5-40 mL IntraVENous Q8H    sodium chloride (NS) flush 5-40 mL  5-40 mL IntraVENous PRN    acetaminophen (TYLENOL) tablet 650 mg  650 mg Oral Q6H PRN    Or    acetaminophen (TYLENOL) suppository 650 mg  650 mg Rectal Q6H PRN    polyethylene glycol (MIRALAX) packet 17 g  17 g Oral DAILY PRN    promethazine (PHENERGAN) tablet 12.5 mg  12.5 mg Oral Q6H PRN    Or    ondansetron (ZOFRAN) injection 4 mg  4 mg IntraVENous Q6H PRN    enoxaparin (LOVENOX) injection 40 mg  40 mg SubCUTAneous DAILY    piperacillin-tazobactam (ZOSYN) 3.375 g in 0.9% sodium chloride (MBP/ADV) 100 mL MBP  3.375 g IntraVENous ONCE    Followed by    piperacillin-tazobactam (ZOSYN) 3.375 g in 0.9% sodium chloride (MBP/ADV) 100 mL MBP  3.375 g IntraVENous Q8H    lamoTRIgine (LaMICtal) tablet 25 mg  25 mg Oral DAILY    lamoTRIgine (LaMICtal) dissolvable tablet 50 mg  50 mg Oral QPM     Current Outpatient Medications   Medication Sig    cholecalciferol, vitamin D3, (Vitamin D3) 50 mcg (2,000 unit) tab Take 2,000 Units by mouth daily.     lamoTRIgine (LaMICtal) 25 mg tablet Take 25 mg by mouth daily.    lamoTRIgine (LaMICtal) 50 mg disintegrating tablet Take 50 mg by mouth nightly.  levothyroxine (SYNTHROID) 50 mcg tablet Take 50 mcg by mouth daily.  omeprazole (PRILOSEC) 20 mg capsule Take 20 mg by mouth two (2) times a day.  LORazepam (ATIVAN) 1 mg tablet Take 1 mg by mouth daily as needed for Anxiety. Lab Data Reviewed: (see below)  Lab Review:     Recent Labs     05/04/21 0236   WBC 8.9   HGB 13.9   HCT 41.1        Recent Labs     05/04/21 0236      K 3.7      CO2 28   *   BUN 18   CREA 0.96   CA 8.8   ALB 3.2*   TBILI 0.2   ALT 17     No results found for: GLUCPOC  No results for input(s): PH, PCO2, PO2, HCO3, FIO2 in the last 72 hours. No results for input(s): INR, INREXT in the last 72 hours. All Micro Results     Procedure Component Value Units Date/Time    CULTURE, MRSA [[id number]]     Order Status: Sent Specimen: Nasal from Nares     CULTURE, BLOOD [[id number]] Collected: 05/04/21 0236    Order Status: Completed Specimen: Blood Updated: 05/04/21 0335    CULTURE, BLOOD [[id number]] Collected: 05/04/21 0236    Order Status: Completed Specimen: Blood Updated: 05/04/21 0243          Other pertinent lab: none    Total time spent with patient: 45 Minutes I personally rounded from 3:00 to 3:45 PM, in addition to the time spent by my partner, Dr Rosey Javed, earlier today. I personally reviewed chart, notes, data and current medications in the medical record. I have personally examined and treated the patient at bedside during this period. I personally made additional diagnoses, placed additional orders and had additional discussions.                  Care Plan discussed with: Patient, Family, Care Manager, Nursing Staff, Consultant/Specialist and >50% of time spent in counseling and coordination of care    Discussed:  Code Status, Care Plan and D/C Planning    Prophylaxis:  SCD's and H2B/PPI    Disposition:  Home w/Family           ___________________________________________________    Attending Physician: Ricco Howell MD

## 2021-05-04 NOTE — H&P
Hospitalist Admission Note NAME: Angelene Lesches :  1965 MRN:  [id number] Date/Time:  2021 5:32 AM 
 
Patient PCP: Balbina Delgado MD 
________________________________________________________________________ Given the patient's current clinical presentation, I have a high level of concern for decompensation if discharged from the emergency department. Complex decision making was performed, which includes reviewing the patient's available past medical records, laboratory results, and x-ray films. My assessment of this patient's clinical condition and my plan of care is as follows. Assessment / Plan: #? Seizure?: Pt is on lamictal as outpt and does appear to be in a post-ictal state. Further, clinical hx suggests post-ictal state as well. It seems evident that he has had a general decline over the last several months (was previously able to feed himself and walk, now needs to be fed, minimal ambulation). - Neuro c/s - Palliative c/s given rapid decline recently #AHRF: From what I can ascertain, he does not normally require oxygen. At present, he is needing 4Lnc. Imaging with atelectasis vs bilat pneumonia. No fever or white count, but in lieu of above he is at risk for aspiration pna 
 - Hold on abx for now, would start if febrile #Alzheimer's Dementia: Under[place] intellectual disability, Down Syndrome and based on recent psych notes it appears he has had rapid decline recently I have personally reviewed the radiographs, laboratory data in Epic and decisions and statements above are based partially on this personal interpretation. Code Status: DNR/DNI 
DVT Prophylaxis: Lovenox GI Prophylaxis: not indicated Subjective: CHIEF COMPLAINT: \"Seizure? \" HISTORY OF PRESENT ILLNESS:    
Jeison Tinoco is a 60 y.o.  M with a PMHx ID, Down Syndrome, dementia, seizure disorder who presents from group home.  Caregiver is present and notes that he heard pt snoring very loudly, more than usual, and was poorly responsive. Attempted to lay pt down but he momentarily stopped breathing. Caregiver did appreciate jerking motions. Caregiver notes that patient has had overall decline in last several months. Had been able to feed himself, walk, but now doesn't. PMHx:  
Intellectual Disability Down Syndrome Alzheimer's Dementia GERD Surg Hx:  
None Social History Tobacco Use  Smoking status: Not on file Substance Use Topics  Alcohol use: Not on file FHx non-contributory Allergies Allergen Reactions  Aricept [Donepezil] Seizures  Reglan [Metoclopramide Hcl] Other (comments) \"psychotic\" Prior to Admission medications Medication Sig Start Date End Date Taking? Authorizing Provider  
ascorbic acid, vitamin C, (Vitamin C) 500 mg tablet Take 1 Tab by mouth two (2) times a day. 2/6/21   Anna Corral MD  
zinc sulfate (Zinc-220) 220 (50) mg capsule Take 1 Cap by mouth daily. 2/6/21   Anna Corral MD  
 
REVIEW OF SYSTEMS:  See HPI for details Patient was not able to provide review of systems due to mental status change/acute illness Objective: VITALS:   
Visit Vitals /64 (BP 1 Location: Right upper arm, BP Patient Position: At rest) Pulse 77 Temp 97.5 °F (36.4 °C) Resp 20 Wt 55.9 kg (123 lb 3.2 oz) SpO2 96% BMI 20.50 kg/m² PHYSICAL EXAM: 
 
Physical Exam: 
 
Gen: Frail, chronically ill appearing, unresponsive but protecting airway HEENT:  Pink conjunctivae, PERRL, moist mucous membranes Neck: Supple, without masses, thyroid non-tender Resp: No accessory muscle use, protecting airway Card: No murmurs, normal S1, S2 without thrills, bruits or peripheral edema Abd:  Soft, non-tender, non-distended, normoactive bowel sounds are present, no palpable organomegaly and no detectable hernias Lymph:  No cervical or inguinal adenopathy Musc: No cyanosis or clubbing Skin: No rashes or ulcers, skin turgor is good Neuro:  Unresponsive to noxious stimuli Psych:  Unresponsive to noxious stimuli 
 
 
 
 
_______________________________________________________________________ Care Plan discussed with: 
  Comments Patient x Discussed with patient in room. POC outlined and Questions answered Family RN x Care Manager Consultant:  isaias QUEVEDO MD  
_______________________________________________________________________ Recommended Disposition:  
Home with Family HH/PT/OT/RN   
SNF/LTC   
[facility]   
________________________________________________________________________ TOTAL TIME:  45 Minutes Comments >50% of visit spent in counseling and coordination of care  Chart reviewed Discussion with patient and/or family and questions answered  
 
________________________________________________________________________ Signed: Cosme Negrete MD 
 
This note will not be viewable in [address] Procedures: see electronic medical records for all procedures/Xrays and details which were not copied into this note but were reviewed prior to creation of Plan. LAB DATA REVIEWED:   
Recent Results (from the past 24 hour(s)) POC LACTIC ACID Collection Time: 05/04/21  2:35 AM  
Result Value Ref Range Lactic Acid (POC) 2.55 (HH) 0.40 - 2.00 mmol/L METABOLIC PANEL, COMPREHENSIVE Collection Time: 05/04/21  2:36 AM  
Result Value Ref Range Sodium 141 136 - 145 mmol/L Potassium 3.7 3.5 - 5.1 mmol/L Chloride 108 97 - 108 mmol/L  
 CO2 28 21 - 32 mmol/L Anion gap 5 5 - 15 mmol/L Glucose 151 (H) 65 - 100 mg/dL BUN 18 6 - 20 MG/DL Creatinine 0.96 0.70 - 1.30 MG/DL  
 BUN/Creatinine ratio 19 12 - 20 GFR est AA >60 >60 ml/min/1.73m2 GFR est non-AA >60 >60 ml/min/1.73m2 Calcium 8.8 8.5 - 10.1 MG/DL Bilirubin, total 0.2 0.2 - 1.0 MG/DL  
 ALT (SGPT) 17 12 - 78 U/L  
 AST (SGOT) 14 (L) 15 - 37 U/L Alk. phosphatase 89 45 - 117 U/L  Protein, total 6.6 6.4 - 8.2 g/dL Albumin 3.2 (L) 3.5 - 5.0 g/dL Globulin 3.4 2.0 - 4.0 g/dL A-G Ratio 0.9 (L) 1.1 - 2.2    
CBC WITH AUTOMATED DIFF Collection Time: 05/04/21  2:36 AM  
Result Value Ref Range WBC 8.9 4.1 - 11.1 K/uL  
 RBC 4.00 (L) 4.10 - 5.70 M/uL  
 HGB 13.9 12.1 - 17.0 g/dL HCT 41.1 36.6 - 50.3 % .8 (H) 80.0 - 99.0 FL  
 MCH 34.8 (H) 26.0 - 34.0 PG  
 MCHC 33.8 30.0 - 36.5 g/dL  
 RDW 13.7 11.5 - 14.5 % PLATELET 197 014 - 899 K/uL MPV 9.9 8.9 - 12.9 FL  
 NRBC 0.0 0  WBC ABSOLUTE NRBC 0.00 0.00 - 0.01 K/uL NEUTROPHILS 57 32 - 75 % LYMPHOCYTES 35 12 - 49 % MONOCYTES 7 5 - 13 % EOSINOPHILS 0 0 - 7 % BASOPHILS 0 0 - 1 % IMMATURE GRANULOCYTES 1 (H) 0.0 - 0.5 % ABS. NEUTROPHILS 5.0 1.8 - 8.0 K/UL  
 ABS. LYMPHOCYTES 3.1 0.8 - 3.5 K/UL  
 ABS. MONOCYTES 0.6 0.0 - 1.0 K/UL  
 ABS. EOSINOPHILS 0.0 0.0 - 0.4 K/UL  
 ABS. BASOPHILS 0.0 0.0 - 0.1 K/UL  
 ABS. IMM. GRANS. 0.1 (H) 0.00 - 0.04 K/UL  
 DF AUTOMATED    
TROPONIN I Collection Time: 05/04/21  2:36 AM  
Result Value Ref Range Troponin-I, Qt. <0.05 <0.05 ng/mL SAMPLES BEING HELD Collection Time: 05/04/21  2:36 AM  
Result Value Ref Range SAMPLES BEING HELD 1SST,1RD,1BLU   
 COMMENT Add-on orders for these samples will be processed based on acceptable specimen integrity and analyte stability, which may vary by analyte.

## 2021-05-04 NOTE — PROGRESS NOTES
Admission Medication Reconciliation:     Information obtained from:    [facility] pharmacy [address]:  YES    Comments/Recommendations: The patient is unable to participate in the interview. Pharmacist called [facility] and obtained a read out of medications and  the name of the group home as it was not listed in the chart. Pharmacist called [facility] and spoke with director, Aspen. The director confirmed the list of medications obtained from [address] and reports the patient had his morning and evening doses yesterday prior to admission. Phone number for care giver incorrect in the chart. Case management notified and will update the number. ¹RxQuery pharmacy benefit data reflects medications filled and processed through the patient's insurance, however   this data does NOT capture whether the medication was picked up or is currently being taken by the patient. Prior to Admission Medications   Prescriptions Last Dose Informant Taking? LORazepam (ATIVAN) 1 mg tablet  Other Yes   Sig: Take 1 mg by mouth daily as needed for Anxiety. cholecalciferol, vitamin D3, (Vitamin D3) 50 mcg (2,000 unit) tab 5/3/2021 at Unknown time Other Yes   Sig: Take 2,000 Units by mouth daily. lamoTRIgine (LaMICtal) 25 mg tablet 5/3/2021 at Unknown time Other Yes   Sig: Take 25 mg by mouth daily. lamoTRIgine (LaMICtal) 50 mg disintegrating tablet 5/3/2021 at Unknown time Other Yes   Sig: Take 50 mg by mouth nightly. levothyroxine (SYNTHROID) 50 mcg tablet 5/3/2021 at Unknown time Other Yes   Sig: Take 50 mcg by mouth daily. omeprazole (PRILOSEC) 20 mg capsule 5/3/2021 at Unknown time Other Yes   Sig: Take 20 mg by mouth two (2) times a day.       Facility-Administered Medications: None         Please contact the main inpatient pharmacy with any questions or concerns at [phone] and we will direct you to the clinical pharmacist covering this patient's care while in-house.    Gricelda Dougherty, PharmD, BCPS

## 2021-05-04 NOTE — Clinical Note
Patient Class[de-identified] OBSERVATION [104]   Type of Bed: Remote Telemetry [29]   Reason for Observation: < 48 hrs   Admitting Diagnosis: Seizure [facility]) [[id number]]   Admitting Physician: Danish Burnham [[id number]]   Attending Physician: Lionel Wong

## 2021-05-04 NOTE — CONSULTS
Palliative Medicine Brief Note-     I met with patient, he appears to be nearing end of life. I spoke with patients mother/primary health care decision maker, she is very clear regarding comfort care. Patients mother would like for patient to be transferred back to group home with Hospice, however  I am somewhat concerned that he may not survive transport. Current Plan is for parents to come in to see patient around 2pm, In the meantime I  have placed comfort orders including scheduled ativan for seizure prophylaxis. Please contact me via Fanchimp with any questions or concerns.      Thank you

## 2021-05-04 NOTE — PROGRESS NOTES
Admission Medication Reconciliation:     The patient is a resident of a group home. The name of the group home is not listed in the progress notes. Paperwork from the group home is not present in the patient's room or scanned into the EMR. Night shift nurse reports caregiver from the group home left before she could complete the medication reconciliation or obtain copies of the patient's MAR. Karan Pack is listed as a caregiver. Pharmacist called the number listed in the chart and was informed this is no longer the number for [facility] or a group home. Per Rx Query the patient's medications are filled with [facility]. Pharmacist called [facility] but the pharmacy is closed until 9 am. Pharmacist will call [facility] after 9am today [phone].     Thank you,      Yahaira Castillo, PharmD, BCPS

## 2021-05-04 NOTE — ED TRIAGE NOTES
Pt. Susan Tapia in by EMS from [facility], states has hx of seizure, had a seizure earlier unknown if this is still post ictal, pt. Did receive ativan but caregiver said that is for pain and not seizures.

## 2021-05-04 NOTE — PROGRESS NOTES
5/4/2021  10:25 AM  Case management note:    UPDATE:  GIP ARYANAL for hospice place to [facility] they are aware that if discharges he would need referral to at home care. Will continue to follow    UPDATE:  [facility] called and stated Dr. Preethi Velazquez wants [facility] [address] [facility], however per notes in chart patient may not survive to go home with hospice. Update:  Received hospice consult. Called Melisa Corley care giver and Dr. Gabriela Morris had spoke to her. She wanted to speak to someone regarding hospice. Sent referral to [facility]  Will continue to follow faxed clinicals. [facility] is able to accept. Sounds like patient will discharge in a couple of days. Carlie Cobb from [facility] will follow . She can be reached if needed on cell phone. Reason for Admission:  Seizure  Patient lives in group home. Recently he is unable to walk and feed himself some days. Patient had seizure like activity. Patient has a wheelchair and mostly sits in it and does not interact with other. He has Down Syndrome, per caregiver he has Dementia as well  [facility]  They can transport home  Reviewed OBS status with Aspen Corley [phone]                     RUR Score:                     Plan for utilizing home health:          PCP: First and Last name:  Bogdan Saenz MD     Name of Practice:    Are you a current patient: Yes/No: yes   Approximate date of last visit:    Can you participate in a virtual visit with your PCP: no                    Current Advanced Directive/Advance Care Plan: DNR      Healthcare Decision Maker:                Primary Decision Maker: Neeru Kemp - Mother, Sonia Marquez - [phone]    Secondary Decision Maker: Eliel Doran - Father - [phone]                  Transition of Care Plan:                      1. Return to Group home  2.  CM to follow for discharge needs    Care Management Interventions  PCP Verified by CM: Yes(Dr. Chanda Juarez Mekendall)  Mode of Transport at Discharge: Self  Current Support Network:  Adult Group Home  Confirm Follow Up Transport: Wheelchair Luciano Wolf for Transition of Care is Related to the Following Treatment Goals : seizure  Discharge Location  Discharge Placement: Group home   Judy

## 2021-05-05 PROBLEM — G40.909 SEIZURE DISORDER (HCC): Status: ACTIVE | Noted: 2021-01-01

## 2021-05-05 NOTE — PROGRESS NOTES
Palliative Medicine Social Work Note SW made supportive visit to pt along with [facility]. No family currently present. Pt was unresponsive to voice/touch, did not appear to be in distress at time of visit but liaison witnessed possible seizure activity vs terminal agitation earlier; pt has since been medicated for comfort. Hospice liaison spoke with pt's mother and with owner of group home where pt resides. Original plan had been for pt to return to group home with support from hospice; however, group home staff no longer feel equipped to meet pt's end of life care needs. [facility] will reach back out to mother to discuss inpt hospice services. SW will be available for support/assistance, as needed. Thank you for including Palliative Medicine in the care of Mr. Meghna Infante. Doc [address], [facility] Pall[facility]:  [phone])

## 2021-05-05 NOTE — PROGRESS NOTES
Problem: Pressure Injury - Risk of 
Goal: *Prevention of pressure injury Description: Document Amol Scale and appropriate interventions in the flowsheet. Outcome: Progressing Towards Goal 
Note: Pressure Injury Interventions: 
Sensory Interventions: Assess need for specialty bed, Float heels, Keep linens dry and wrinkle-free Moisture Interventions: Check for incontinence Q2 hours and as needed, Internal/External urinary devices Activity Interventions: Pressure redistribution bed/mattress(bed type) Mobility Interventions: HOB 30 degrees or less, Turn and reposition approx. every two hours(pillow and wedges) Nutrition Interventions: Document food/fluid/supplement intake Friction and Shear Interventions: Apply protective barrier, creams and emollients, Lift sheet, Lift team/patient mobility team

## 2021-05-05 NOTE — HOSPICE
[facility] [place] [facility] Good Help to Those in Need 
[phone] Inpatient Nursing Admission Patient Name: Guido Huggins YOB: 1965 Age: 60 y.o. Date of Hospice Admission: 5/5/2021 Hospice Attending Elected by Patient: Maggie Jacobsen MD 
Primary Care Physician: Kayla Funes MD 
Admitting RN: Lesly López : Rhett Wilkinson Level of Care (GIP/Routine/Respite): GIP Facility of Care: [facility] Patient Room: 513/ HOSPICE SUMMARY  
ER Visits/ Hospitalizations in past year: 1 Hospice Diagnosis: Seizure disorder ([facility] [address] [I75.046] Onset Date of Hospice Diagnosis: 05/04/21 Summary of Disease Progression Leading to Hospice Diagnosis:  Per palliative H&P \"Shalom Pacheco is a 60 y.o. with a past history of Downs Syndrome, Intellectual disabilities, Alzheimer's Dementia and seizures. Patient presented to ER w/ facility staff reporting patient had seizure earlier in the day and now not as alert. They also reported he has had a functional decline over past 6 months, limited standing and now longer feeding self. Patient was admitted on  5/4/2021 from [facility] a diagnosis of Seizures\" Patient has remained minimally responsive since admission, transitioned to comfort measures only last night at request of the family. Hypotensive, NPO Breathing unlabored however audible secretions present. He is now showing signs of terminal agitation and/or seizures requiring scheduled IV ativan. Will admit to [facility] hospice for IV symptom management and nursing assessments Co-Morbidities:  
Patient Active Problem List  
Diagnosis Code  Seizure ([address] R56.9  Underweight R63.6  Hypothyroid E03.9  GERD (gastroesophageal reflux disease) K21.9  FTT (failure to thrive) in adult R62.7  Down's syndrome Q90.9  Dementia ([address] F03.90  Shock ([address] R57.9  Sepsis ([address] A41.9  Hypothermia T68. Collie Dice  Bradycardia R00.1  Seizure disorder ([address] G40.909 Diagnoses RELATED to the terminal prognosis: alzheimer's dementia, down's syndrome, altered mental status Other Diagnoses: see above Rationale for a prognosis of life expectancy of 6 months or less if the disease follows its normal course (Disease Specific History): Matthias Paz is a 60 y.o. who was admitted to [facility]. The patient's principle diagnosis of seizures has resulted in altered mental status, inability to take PO, terminal agitation. Functionally, the patient's Palliative Performance Scale has declined over a period of weeks and is estimated at 10. Objective information that support this patients limited prognosis includes: unresponsive, unable to tolerate PO intake of any kind, ongoing seizure activity, restlessness, agitation. The patient/family chose comfort measures with the support of Hospice. Patient meets for GIP LOC as evidenced by need for IV symptom management, frequent medication adjustments and nursing assessments Prognosis estimated based on 05/05/21 clinical assessment is:  
[] Few to Many Hours [x] Hours to Days  
[] Few to Many Days  
[] Days to Weeks  
[] Few to Many Weeks  
[] Weeks to Months  
[] Few to Many Months ASSESSMENT Patient self-reports:  []  Yes    [x] No 
 
SYMPTOMS: agitation, possible seizure activity SIGNS/PHYSICAL FINDINGS: tremors, restlessness, jerking movements, unresponsive to verbal stimuli KARNOFSKY: 10 Learning Assessment: 
Patient is unable to learn Caregiver is willing to learn and receptive to information given CLINICAL INFORMATION Wt Readings from Last 3 Encounters:  
05/04/21 55.9 kg (123 lb 3.2 oz) 02/06/21 53.5 kg (118 lb) 10/15/20 53.5 kg (118 lb) Ht Readings from Last 3 Encounters:  
02/06/21 5' 5\" (1.651 m)  
10/15/20 5' 5\" (1.651 m) There is no height or weight on file to calculate BMI. There were no vitals taken for this visit. LAB VALUES No results found for this visit on 21 (from the past 12 hour(s)). No results found for this visit on 21 (from the past 6 hour(s)). Lab Results Component Value Date/Time Protein, total 6.6 2021 02:36 AM  
 Albumin 3.2 (L) 2021 02:36 AM  
 
 
Currently this patient has: 
[] Supplemental O2 [x] Peripheral IV  [] PICC    [] PORT [x] Vera Catheter [] NG Tube   [] PEG Tube [] Ostomy   
[] AICD: Has ICD been deactivated? [] Yes [] No:______ PLAN 1. Admit to hospice under GIP LOC 2. Scheduled IV  1mg q4hr ativan to prevent seizures as well as control terminal agitation 3. Scheduled IV 0.2mg q4hr Robinul for  secretions 4. PRN medications in place for breakthrough symptom management 5. Provide support to family at bedside 6. Infection control and prevention 7. Monitor closely for changes in symptoms that would warrant medication adjustments 8. Seizure dosing of Ativan in place for signs of active seizing 9. Fall and seizure pcns ordered Hospice Team Frequency Orders: 
Skilled Nurse -   Daily x 7 days /every other day x 7 days with 5 PRN visits for symptom control. JOSÉ ANTONIO  1 visit for initial assessment/evaluation for family support and need for volunteer services. Salvador Greenberg  1 visit for initial assessment/evaluation for spiritual support. ADVANCE CARE PLANNING (Complete in ACP Flow Sheet) Code Status: DNR Durable DNR: []  Yes  [x]  No 
Code Status Discussed/Confirmed: yes Preference for Other Life Sustaining Treatment Discussed/Confirmed: yes Hospitalization Preference:  (ex. Patient would like to receive end of life care in the hospital, in a facility, at home etc.) Advance Care Planning 2021 Patient's Healthcare Decision Maker is: Named in scanned ACP document Confirm Advance Directive Yes, on file  Service: [] Yes  [x]  No      [] Unknown Appropriate for Pinning Ceremony:  [] Yes     [x] No 
Presybeterian: NO Tenriism  Home: TBD 
 
DISCHARGE PLANNING 1. Discharge Plan: return to group home w/ hospice support should patient stabilize 2. Patient/Family teaching: EOL symptom mgmt 3. Response to patient/family teaching: receptive SOCIAL/EMOTIONAL/SPIRITUAL NEEDS Spiritual Issues Identified: none at this time, both hospital and hospice chaplain available as needed Psych/ Social/ Emotional Issues Identified: Patient has long history of developmental disorders, living in group home for past 2 years. Parents are supportive and will be coming to bedside. Caregiver Support: 
[x] Provided information on End of Life Care  
[x] Material Provided: Adeel Border From My Sight or Dine inluciana's End  
 
CARE COORDINATION Dr. Ruby Asencio contacted, discharge to hospice order received Dr. Susan Richardson contacted, agrees to serve as attending provider for hospice and provided verbal certification of terminal illness with life expectancy of 6 months or less. Orders for hospice admission, medications and plan of treatment received. Medication reconciliation completed. MEDS: See medication list below DME: Per hospital 
Supplies: Per hospital 
IDT communication to include MD, SN, SW, CH and support team 
 
ALLERGIES AND MEDICATIONS Allergies: Allergies Allergen Reactions  Aricept [Donepezil] Seizures  Reglan [Metoclopramide Hcl] Other (comments) \"psychotic\" Current Facility-Administered Medications Medication Dose Route Frequency  ondansetron (ZOFRAN) injection 4 mg  4 mg IntraVENous Q4H PRN  
 LORazepam (ATIVAN) injection 1 mg  1 mg IntraVENous Q15MIN PRN  
 ketorolac (TORADOL) injection 30 mg  30 mg IntraVENous Q8H PRN  
 glycopyrrolate (ROBINUL) injection 0.2 mg  0.2 mg IntraVENous Q4H  
 bisacodyL (DULCOLAX) suppository 10 mg  10 mg Rectal DAILY PRN  
 morphine injection 2 mg  2 mg IntraVENous Q15MIN PRN  
 LORazepam (ATIVAN) injection 1 mg  1 mg IntraVENous Q4H  
 LORazepam (ATIVAN) injection 2 mg  2 mg IntraVENous Q5MIN PRN

## 2021-05-05 NOTE — PROGRESS NOTES
Problem: Pressure Injury - Risk of 
Goal: *Prevention of pressure injury Description: Document Amol Scale and appropriate interventions in the flowsheet. Outcome: Progressing Towards Goal 
Note: Pressure Injury Interventions: 
  
 
  
 
  
 
  
 
  
 
  
 
  
 
 
 
  
Problem: Patient Education: Go to Patient Education Activity Goal: Patient/Family Education Outcome: Progressing Towards Goal 
  
Problem: Communication Deficit Goal: Effectively communicate symptoms, needs, and concerns Outcome: Progressing Towards Goal 
  
Problem: Imminent death Goal: Collaborate with patient/family/caregiver/interdisciplinary team to minimize and manage end of life symptoms Outcome: Progressing Towards Goal 
  
Problem: Hospice Orientation Goal: Demonstrate understanding of hospice philosophy, plan of care, and home hospice program 
Description: The patient/family/caregiver will demonstrate understanding of hospice philosophy, plan of care and the home hospice program as evidenced by participation in meeting the patient's psychosocial, spiritual, medical, and physical needs inclusive of medical supplies/equipment focusing on symptoms. Outcome: Progressing Towards Goal 
  
Problem: Potential for Skin Breakdown Goal: Demonstrate ability to care for skin, monitor areas of breakdown and demonstrate methods to prevent breakdown Description: Patient/family/caregiver will demonstrate ability to care for patient's skin, monitor for areas of breakdown, and demonstrate methods to prevent breakdown during hospice care. Outcome: Progressing Towards Goal 
  
Problem: Risk for Falls Goal: Free of falls during episode of care Description: Patient will be free of falls during episode of care. Outcome: Progressing Towards Goal 
  
Problem: Comfort Deficit Goal: Reduce/control pain Description: Patient will report that pain has been reduced or controlled through verbal and nonverbal means and that measures to promote comfort are effective. Outcome: Progressing Towards Goal 
  
Problem: Anxiety/Agitation Goal: Verbalize and demonstrate ability to manage anxiety Description: The patient/family/caregiver will verbalize and demonstrate ability to manage the patient's anxiety throughout hospice care.  
Outcome: Progressing Towards Goal

## 2021-05-05 NOTE — PALLIATIVE CARE DISCHARGE
The Palliative Medicine team was consulted as part of your / your loved one's care in the hospital. Our team is a supportive service; we strive to relieve suffering and improve quality of life. You identified the following goal(s) as your main focus for healthcare:  Comfort We reviewed advance care planning information, which includes the following: 
Advance Care Planning 5/4/2021 Patient's Healthcare Decision Maker is: Named in scanned ACP document Confirm Advance Directive Yes, on file We reviewed / discussed your code status as: DNR 
   Full Code means perform CPR in the event of cardiac arrest 
   [facility] means do NOT perform CPR in the event of cardiac arrest 
   Partial Code means you have specific preferences, please discuss with your health care team 
   No Order means this issue was not addressed / resolved during your stay Because of the importance of this information, we are providing you with a printed copy to share with other healthcare providers after this hospitalization is complete. If any of the above information is incomplete or incorrect, please contact the Palliative Medicine team at [phone].

## 2021-05-05 NOTE — CONSULTS
Palliative Medicine brief note- 
 
Patient on Comfort Only care, there is no definitive discharge plan at this time. [facility] is evaluating for GIP level of care 
 
 -I spoke with owner of [facility] she confirmed that patient can have supplemental O2 as needed and that their staff can administer Sublingual lorazepam for seizures. Mrs. Autumn Pierson  Stated that though they do administer prn medications for seizures, agitation and cough, tthey are not experienced with end of life and giving PRN opioids for signs of pain or SOB. Patient was seen and I spoke with his nurse Chauncey Figueroa RN. Patient  appeared comfortable, warm to touch, however no sign or symptoms of distress. In addition to scheduled Dexter Cable, he did receive 1 dose of Lorazepam 2mg IV for seizures this morning. I am waiting to hear back from patients parents. Please contact me via [employer] if you see family at bedside.

## 2021-05-05 NOTE — PROGRESS NOTES
[facility]: [phone])    Patient Name: Autumn Rojas  YOB: 1965    Date of Initial Consult: 5/4/2021  Reason for Consult: [address] discussion  Requesting Provider: Dr. Dedrick Farris   Primary Care Physician: Ankita Branch MD     SUMMARY:   Autumn Rojas is a 60 y.o. with a past history of Downs Syndrome, Intellectual disabilities, Alzheimer's Dementia and seizures. Patient presented to ER w/ facility staff reporting patient had seizure earlier in the day and now not as alert. They also reported he has had a functional decline over past 6 months, limited standing and now longer feeding self. Patient was admitted on  5/4/2021 from [facility] a diagnosis of Seizures and Acute Hypoxic Respiratory failure     Current medical issues leading to Palliative Medicine involvement include: GOC Discussion      Psychosocial Hx: born and raised in [place]. Has lived with his parents up until 2 years ago, when he moved into the [facility]. He has 3 siblings, who all live in [place]. Patient participated in various adult work programs. He is described as being a very happy and kind person, who loved dogs, particularly yorkies. PALLIATIVE DIAGNOSES:   1. Concern about end of life  2. Altered Mental status, unspecified  3. Debility  4. Hypoxia  5. Seizures  6. Advanced Care Planning discussion  7. Goals of Care discussion  8. DNR Discussion       PLAN:   1. Prior to visit I spoke with patients nurse Shaye Guzman RN  2. Initially, I met with patient, no family at bedside. Patient unresponsive to voice or touch, unable to participate in discussion. 3. Called patients mother and father and they were able to come and meet with me at bedside and we discussed teams concerns related to AMS, hypothermia, hypotension, bradycardia and hypoxia. 4. Hypoxia- unresolved, continues to require 4L NC. Patient does not use supplemental O2 at home.  CY chest + mild dependent bilat lung opacities, atelectasis vs aspiration. Patient also with fluid filled esophagus. 5. Altered Mental status, unspecified- Unresolved. Patient has had a cognitive decline over the past couple of years, was dx with Dementia, but he had still been able to communicate a couple of weeks ago. 6. Goals of care Discussion- Patients parents aware that he may be at end of life and they are extremely clear, that they do NOT want to prolong the dying process, they just want him to be kept comfortable. ·  I spoke with attending, Dr. Viv Stevens, he placed Hospice consult, but  plans to keep patient overnight, see if he is stable enough  to be transported back to nursing home tomorrow. ·  Unit CM Subha Richards had already contacted another area Hospice that the nursing home uses, should patient be discharged tomorrow. However, I also spoke [facility], she evaluated him for GIP, but he was not found to be appropriate at this time, but may re assess  tomorrow. · I called parents back later in day, provided update, they plan to visit the patient again tomorrow. 7. Comfort Care Only- per patients parent, comfort care only. 8. Seizures- Controlled? No witnessed seizures since presenting to Hospital. Patient had order for oral lamictal. However he is unresponsive and unable to take oral medications. I placed order for Lorazepam 1mg IV every 8 hours scheduled, with order for Lorazepam 2mg IV every 5 minutes as needed for uncontrolled seizures. · If patient discharges back to facility tomorrow, will convert to Lorazepam SL.  9. Patient at end of life, comfort orders for symptoms management placed. Please contact me via [employer] if any uncontrolled symptoms,  questions or concerns.     10. Advanced Care Planning Discussion- Patient does have an AMD and it appears that he signed document and designated his mother as his primary health care decision maker and his father as his secondary health decision maker. 11. DNR Discussion- Patient has a DNR code status. His parents are very clear no CPR and No intubation. He does not have a Durable DNR in EMR. If patient is discharged tomorrow,  will have parents complete DDNR. 12. Palliative team will follow up tomorrow      13. Initial consult note routed to primary continuity provider and/or primary health care team members    15. Communicated plan of care with: Palliative IDT, [facility] [address] Team, Jay RN, unit CM Yonas Gonsales, Dr. Pratibha Treviño,  Bekah Mondragon, Hospice liaison [place]. GOALS OF CARE / TREATMENT PREFERENCES:     GOALS OF CARE:  COMFORT CARE ONLY       TREATMENT PREFERENCES:   Code Status: DNR    Advance Care Planning:  [x] [facility] Interdisciplinary Team has updated the ACP Navigator with Health Care Decision Maker and Patient Capacity      Primary Decision Maker (Active): Juan Alberto Sandoval - Mother, [place] - [phone]    Secondary Decision Maker: Joann Cheryl - Father - [phone]  Advance Care Planning 5/4/2021   Patient's [address] is: Named in scanned ACP document   Confirm Advance Directive Yes, on file             Other Instructions: Other:    As far as possible, the palliative care team has discussed with patient / health care proxy about goals of care / treatment preferences for patient. HISTORY:     History obtained from: medical records/nurse/patients parents    CHIEF COMPLAINT: N/A    HPI/SUBJECTIVE:    The patient is:   [] Verbal and participatory  [x] Non-participatory due to:   Patient is unresponsive to voice and gentle touch, non verbal and not following commands    Clinical Pain Assessment (nonverbal scale for severity on nonverbal patients):          Activity (Movement): Lying quietly, normal position    Duration: for how long has pt been experiencing pain (e.g., 2 days, 1 month, years)  Frequency: how often pain is an issue (e.g., several times per day, once every few days, constant)     FUNCTIONAL ASSESSMENT:     Palliative Performance Scale (PPS): 10          PSYCHOSOCIAL/SPIRITUAL SCREENING:     Palliative IDT has assessed this patient for cultural preferences / practices and a referral made as appropriate to needs (Cultural Services, Patient Advocacy, Ethics, etc.)    Any spiritual / Mosque concerns:  [] Yes /  [x] No    Caregiver Burnout:  [] Yes /  [x] No /  [] No Caregiver Present      Anticipatory grief assessment:   [x] Normal  / [] Maladaptive       ESAS Anxiety:   0  ESAS Depression:  0        REVIEW OF SYSTEMS:     Positive and pertinent negative findings in ROS are noted above in HPI. The following systems were [] reviewed / [x] unable to be reviewed as noted in HPI  Other findings are noted below. Systems: constitutional, ears/nose/mouth/throat, respiratory, gastrointestinal, genitourinary, musculoskeletal, integumentary, neurologic, psychiatric, endocrine. Positive findings noted below. Modified ESAS Completed by: provider                                            PHYSICAL EXAM:     From RN flowsheet:  Wt Readings from Last 3 Encounters:   05/04/21 123 lb 3.2 oz (55.9 kg)   02/06/21 118 lb (53.5 kg)   10/15/20 118 lb (53.5 kg)     Blood pressure (!) 93/57, pulse 77, temperature 99.7 °F (37.6 °C), resp. rate 12, weight 123 lb 3.2 oz (55.9 kg), SpO2 91 %.     Pain Scale 1: FLACC  Pain Intensity 1: 0                 Last bowel movement, if known:     Constitutional: Sleeping, does not wake to voice, Appears comfortable, no signs of distress, adequately nourished, well kept  Eyes: pupils equal, anicteric  ENMT: no nasal discharge, dry  mucous membranes  Cardiovascular: regular rhythm, distal pulses intact, bradycardic  Respiratory: breathing not labored, symmetric, shallow  Gastrointestinal: soft non-tender, +bowel sounds  Musculoskeletal: no deformity, no tenderness to palpation  Skin: warm, dry  Neurologic: unresponsive to voice or touch, not following commands  Psychiatric: full affect, no hallucinations  Other:       HISTORY:     Active Problems:    Seizure (HCC) (5/4/2021)      Underweight ()      Hypothyroid ()      GERD (gastroesophageal reflux disease) ()      FTT (failure to thrive) in adult ()      Shock ([facility] (5/4/2021)      Sepsis ([facility] [address] [facility] (5/4/2021)      Hypothermia (5/4/2021)      Bradycardia (5/4/2021)      Past Medical History:   Diagnosis Date    Dementia ([facility]     Down's syndrome     FTT (failure to thrive) in adult     GERD (gastroesophageal reflux disease)     Hypothyroid     Underweight       No past surgical history on file. No family history on file. History reviewed, no pertinent family history.   Social History     Tobacco Use    Smoking status: Not on file   Substance Use Topics    Alcohol use: Not on file     Allergies   Allergen Reactions    Aricept [Donepezil] Seizures    Reglan [Metoclopramide Hcl] Other (comments)     \"psychotic\"       Current Facility-Administered Medications   Medication Dose Route Frequency    glycopyrrolate (ROBINUL) injection 0.2 mg  0.2 mg IntraVENous Q6H PRN    sodium chloride (NS) flush 5-10 mL  5-10 mL IntraVENous PRN    sodium chloride (NS) flush 5-40 mL  5-40 mL IntraVENous PRN    acetaminophen (TYLENOL) tablet 650 mg  650 mg Oral Q6H PRN    Or    acetaminophen (TYLENOL) suppository 650 mg  650 mg Rectal Q6H PRN    ondansetron (ZOFRAN) injection 4 mg  4 mg IntraVENous Q6H PRN    morphine injection 1 mg  1 mg IntraVENous Q15MIN PRN    LORazepam (ATIVAN) injection 2 mg  2 mg IntraVENous Q5MIN PRN    LORazepam (ATIVAN) injection 1 mg  1 mg IntraVENous Q1H PRN    LORazepam (ATIVAN) injection 1 mg  1 mg IntraVENous Q8H    ketorolac (TORADOL) injection 30 mg  30 mg IntraVENous Q8H PRN          LAB AND IMAGING FINDINGS:     Lab Results   Component Value Date/Time    WBC 8.9 05/04/2021 02:36 AM    HGB 13.9 05/04/2021 02:36 AM    PLATELET 735 79/85/6881 02:36 AM     Lab Results   Component Value Date/Time    Sodium 141 05/04/2021 02:36 AM    Potassium 3.7 05/04/2021 02:36 AM    Chloride 108 05/04/2021 02:36 AM    CO2 28 05/04/2021 02:36 AM    BUN 18 05/04/2021 02:36 AM    Creatinine 0.96 05/04/2021 02:36 AM    Calcium 8.8 05/04/2021 02:36 AM      Lab Results   Component Value Date/Time    Alk. phosphatase 89 05/04/2021 02:36 AM    Protein, total 6.6 05/04/2021 02:36 AM    Albumin 3.2 (L) 05/04/2021 02:36 AM    Globulin 3.4 05/04/2021 02:36 AM     No results found for: INR, PTMR, PTP, PT1, PT2, APTT, INREXT, INREXT   No results found for: IRON, FE, TIBC, IBCT, PSAT, FERR   No results found for: PH, PCO2, PO2  No components found for: GLPOC   No results found for: CPK, CKMB             Total time: 70 min  Counseling / coordination time, spent as noted above: 60 min  > 50% counseling / coordination?: yes    Prolonged service was provided for  []30 min   []75 min in face to face time in the presence of the patient, spent as noted above. Time Start:   Time End:   Note: this can only be billed with 40312 (initial) or 65754 (follow up). If multiple start / stop times, list each separately.

## 2021-05-05 NOTE — WOUND CARE
Wound Consult:  new Visit. Chart reviewed. Consulted for sacral wound. Spoke with patients nurse,  Michaelle Rowland. Patient is resting on a Barber bed with foam mattress. Heels off loaded with pillows. Patient is unresponsive; requires 2 assists to move side to side in bed. Palliative care Amol score 8. Assessment: 
Bilateral heels skin intact , no redness noted POA scrotum , blanchable redness with small areas maceration. Zinc paste applied POA Sacrum- 0.3x0.2x0.1 linear partial thickness, dry skin around wound, blanchable redness periwound,friction, sacral foam applied POA-Buttocks slightly pink and blanchable. Wound Recommendations: 
Sacrum- apply sacral foam dressing for protection, change every 3 days Scrotum- apply zinc paste BID and as needed Skin Care / PI Prevention Recommendations: 1. Minimize friction/shear: minimize layers of linen/pads under patient. 2. Off load pressure/reposition:  turn and reposition approximately every 2 hours; float heels with pillows or use off loading heel boots; waffle cushion for sitting; position wedge. 3. Manage Moisture - keep skin folds dry; incontinence skin care with incontinence wipes; Zinc paste barrier ointment. 4. Continue to monitor nutrition, pain, and skin risk scale, and skin assessment. Plan: 
Spoke with Dr. Martha Gaspar regarding findings and proposed orders for treatment. We will continue to reassess  and as needed. Please re-consult should concerns arise despite continued skin/PI prevention measures. [facility], Wound / Ostomy Department Wound Healing Office [phone]

## 2021-05-05 NOTE — H&P
[facility] Good Help to Those in Need 
[phone] Patient Name: Genaro Chandler YOB: 1965 Date of Provider Hospice Visit: 05/05/21 Level of Care:   [x] General Inpatient (GIP)    [] Routine   [] Respite Current Location of Care: 
[] [facility] [x] [address] [] [facility] [] [facility] [] Hospice House THE [facility], patient referred from: 
[] [facility] [] [address] [] [facility] [] [facility] [] Home [] Other:  
 
Date of Original Hospice Admission: 05/05/21 Hospice Medical Director at time of admission: Anita Treviño Principle Hospice Diagnosis: Seizure disorder Diagnoses RELATED to the terminal prognosis: Down's syndrome, Alzheimer's dementia Other Diagnoses: Chick Haseeb Do not cut and paste chart information other than imaging findings Genaro Chandler is a 60y.o. year old who was admitted to [facility]. Pt resident of a group home, with a past history of Downs Syndrome, Intellectual disabilities, Alzheimer's Dementia and seizures. He came into hospital after being found unresponsive, likely post ictal. His parents and staff from the facility report pt has had a functional decline over past 6 months, limited standing and now longer feeding self. Patient has remained minimally responsive and now having more breakthrough seizures, restlessness, observed to have increased oropharyngeal secretions with labored breathing. Patient is dependent for all ADLs PPS is now 20% The patient/family chose comfort measures with the support of Hospice. HOSPICE DIAGNOSES Active Symptoms: 1. Restlessness/agitation/anxiety 2. Seizure like activity 3. Labored breathing 4. Increased oropharyngeal secretions 5. Non verbal pain 6. Decreased responsiveness 7. Hospice care pt PLAN 1. Admit to GIP LOC as pt has declined significantly, is hemodynamically unstable and unresponsive, needs close monitoring and frequent medication administration and titration 2.  Robinul 0.2mg IV scheduled every 4 hours and haldol low dose 0.5mg IV every 4 hours as needed for intractable secretions 3. Ativan 1mg IV scheduled every 4 hours and every 15mts as needed 4. Seizure dose of ativan also added as needed if pt actively seizing 5. Morphine 2mg IV scheduled every 4 hours and every 15mts as needed 6. Other comfort meds as needed 7.  and SW to support family needs 8. Disposition: if pt stabilizes then may be able to return to Group home with hospice care, unlikely as close to end 
9. Hospice Plan of care was reviewed in detail and agree with current plan of care Prognosis estimated based on 05/05/21 clinical assessment is:  
[x] Hours to Days   
[] Days to Weeks   
[] Other: 
 
Communicated plan of care with: Hospice Case Manager; Hospice IDT; Care Team 
 
 GOALS OF CARE Patient/Medical POA stated Goal of Care: comfort [x] I have reviewed and/or updated ACP information in the Advance Care Planning Navigator. This information is available in the [address] [place] [address] Drive link in the patient's chart header. Primary Decision Maker (Health Care Agent):   Primary Decision Maker (Active): Elsy Marie - Mother, Legal Hernández Proctor - [phone] Secondary Decision Maker: Collette Edwards - Father - [phone] Resuscitation Status: DNR If DNR is there a Durable DNR on file? : [x] Yes [] No (If no, complete Durable DNR) HISTORY History obtained from: chart review, hospice liaison CHIEF COMPLAINT: N/A The patient is:  
[] Verbal 
[x] Nonverbal 
[x] Unresponsive HPI/SUBJECTIVE:  Pt just admitted to inpatient hospice. Seen lethargic, unresponsive, breathing is labored and pt has increased airway and chest secretions that are audible REVIEW OF SYSTEMS The following systems were: [] reviewed  [x] unable to be reviewed Positive ROS include: 
Constitutional: fatigue, weakness, in pain, short of breath Ears/nose/mouth/throat: increased airway secretions Respiratory:shortness of breath, wheezing Gastrointestinal:poor appetite, nausea, vomiting, abdominal pain, constipation, diarrhea Musculoskeletal:pain, deformities, swelling legs Neurologic:confusion, hallucinations, weakness Psychiatric:anxiety, feeling depressed, poor sleep Endocrine:  
 
Adult Non-Verbal Pain Assessment Score: 6 Face 
[] 0   No particular expression or smile 
[x] 1   Occasional grimace, tearing, frowning, wrinkled forehead 
[] 2   Frequent grimace, tearing, frowning, wrinkled forehead Activity (movement) [] 0   Lying quietly, normal position 
[x] 1   Seeking attention through movement or slow, cautious movement 
[] 2   Restless, excessive activity and/or withdrawal reflexes Guarding 
[x] 0   Lying quietly, no positioning of hands over areas of body 
[] 1   Splinting areas of the body, tense 
[] 2   Rigid, stiff Physiology (vital signs) 
[] 0   Stable vital signs [] 1   Change in any of the following: SBP > 20mm Hg; HR > 20/minute [x] 2   Change in any of the following: SBP > 30mm Hg; HR > 25/minute Respiratory 
[] 0   Baseline RR/SpO2, compliant with ventilator 
[] 1   RR > 10 above baseline, or 5% drop SpO2, mild asynchrony with ventilator 
[x] 2   RR > 20 above baseline, or 10% drop SpO2, asynchrony with ventilator FUNCTIONAL ASSESSMENT Palliative Performance Scale (PPS): 20% PSYCHOSOCIAL/SPIRITUAL ASSESSMENT Active Problems: 
  Seizure disorder ([address] (5/5/2021) Past Medical History:  
Diagnosis Date  Dementia ([address]  Down's syndrome  FTT (failure to thrive) in adult  GERD (gastroesophageal reflux disease)  Hypothyroid  Underweight No past surgical history on file. Social History Tobacco Use  Smoking status: Not on file Substance Use Topics  Alcohol use: Not on file No family history on file. Allergies Allergen Reactions  Aricept [Donepezil] Seizures  Reglan [Metoclopramide Hcl] Other (comments) \"psychotic\" Current Facility-Administered Medications Medication Dose Route Frequency  ondansetron (ZOFRAN) injection 4 mg  4 mg IntraVENous Q4H PRN  
 LORazepam (ATIVAN) injection 1 mg  1 mg IntraVENous Q15MIN PRN  
 ketorolac (TORADOL) injection 30 mg  30 mg IntraVENous Q8H PRN  
 glycopyrrolate (ROBINUL) injection 0.2 mg  0.2 mg IntraVENous Q4H  
 bisacodyL (DULCOLAX) suppository 10 mg  10 mg Rectal DAILY PRN  
 morphine injection 2 mg  2 mg IntraVENous Q15MIN PRN  
 LORazepam (ATIVAN) injection 1 mg  1 mg IntraVENous Q4H  
 LORazepam (ATIVAN) injection 2 mg  2 mg IntraVENous Q5MIN PRN  
 morphine injection 2 mg  2 mg IntraVENous Q4H  
 haloperidol lactate (HALDOL) injection 0.5 mg  0.5 mg IntraVENous Q4H PRN PHYSICAL EXAM  
 
Wt Readings from Last 3 Encounters:  
05/04/21 55.9 kg (123 lb 3.2 oz) 02/06/21 53.5 kg (118 lb) 10/15/20 53.5 kg (118 lb) There were no vitals taken for this visit. Supplemental O2  [x] Yes  [] NO Last bowel movement:  
 
Currently this patient has: 
[x] Peripheral IV [] PICC  [] PORT [] ICD [x] Vera Catheter [] NG Tube   [] PEG Tube   
[] Rectal Tube [] Drain 
[] Other:  
 
Constitutional:lethargic, unresponsive, pale, breathing labored Eyes: closed ENMT: audible airway secretions Cardiovascular: tachycardic, no edema Respiratory: labored breathing, increased chest secretions Gastrointestinal: soft, non tender, no masses Musculoskeletal: thin extremities, muscle wasting Skin: warm, pale Neurologic:unresponsive, lethargic Psychiatric: N/A Other:  
 
 
Pertinent Lab and or Imaging Tests: 
Lab Results Component Value Date/Time  Sodium 141 05/04/2021 02:36 AM  
 Potassium 3.7 05/04/2021 02:36 AM  
 Chloride 108 05/04/2021 02:36 AM  
 CO2 28 05/04/2021 02:36 AM  
 Anion gap 5 05/04/2021 02:36 AM  
 Glucose 151 (H) 05/04/2021 02:36 AM  
 BUN 18 05/04/2021 02:36 AM  
 Creatinine 0.96 05/04/2021 02:36 AM  
 BUN/Creatinine ratio 19 05/04/2021 02:36 AM  
 GFR est AA >60 05/04/2021 02:36 AM  
 GFR est non-AA >60 05/04/2021 02:36 AM  
 Calcium 8.8 05/04/2021 02:36 AM  
 
Lab Results Component Value Date/Time Protein, total 6.6 05/04/2021 02:36 AM  
 Albumin 3.2 (L) 05/04/2021 02:36 AM  
 
   
 
Total time: 70mts Counseling / coordination time: 30mts > 50% counseling / coordination?:

## 2021-05-05 NOTE — DISCHARGE SUMMARY
Physician Discharge Summary Patient ID: Jenny Alvarez 
[id number] 
60 y.o. 
1965 Admit date: 5/4/2021 Discharge date of service and time: 5/5/2021 Admission Diagnoses: Seizure ([address] [R56.9] Discharge Diagnoses:   
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  Resolved. Consulted neurology. Continue lamictal. Mother think this is causing excessive lethargy, and would consider another agent. She is realistic that he is near death and wants to focus on comfort. 
  
Aspiration PNA - POA, presumed, due to sz. Check MRSA, started Zosyn, but at mother's request we have converted to comfort measures and stopped Abx.  
  
Sepsis / Hypothermia / Bradycardia / Shock - Worsening after admit. May be due to PNA, vs overall condition. Discussed at length with his mother, YOLANDA. No agressive measures. DNR. No pressors or central line. Hospice consult. 
  
FTT (failure to thrive) in adult / Underweight - worsening over years. Nutrition support when eating. Mother would like him to be at [address] [place] [address], will DC on hospice tomorrow 
  
Hypothyroid - hold homeopathic dose 
  
GERD (gastroesophageal reflux disease) - PPI for comfort 
  
PCP: Bc Young MD 
 
Consults: Palliative Care and Hospice Significant Diagnostic Studies: See Hospital Course Discharged home in improved condition. Discharge Exam: 
BP (!) 93/57 (BP 1 Location: Right upper arm, BP Patient Position: At rest) Pulse 77 Temp 100 °F (37.8 °C) Resp 12 Wt 55.9 kg (123 lb 3.2 oz) SpO2 91% BMI 20.50 kg/m²  
  
Gen:  Frai, thin ill appearing, in no acute distress HEENT:  Pink conjunctivae, PERRL, hearing intact to voice, moist mucous membranes Neck:  Supple, without masses, thyroid non-tender Resp:  No accessory muscle use, clear breath sounds without wheezes rales or rhonchi 
Card:   No murmurs, normal S1, S2 without thrills, bruits or peripheral edema Abd:  Soft, non-tender, non-distended, normoactive bowel sounds are present, no mass Lymph:  No cervical or inguinal adenopathy Musc:  No cyanosis or clubbing Skin:  No rashes or ulcers, skin turgor is reduced Neuro:  Cranial nerves are grossly intact, genera motor weakness, does not follow commands Psych:  somnolent Patient Instructions:  
Current Discharge Medication List  
  
CONTINUE these medications which have NOT CHANGED Details  
lamoTRIgine (LaMICtal) 25 mg tablet Take 25 mg by mouth daily. lamoTRIgine (LaMICtal) 50 mg disintegrating tablet Take 50 mg by mouth nightly. omeprazole (PRILOSEC) 20 mg capsule Take 20 mg by mouth two (2) times a day. LORazepam (ATIVAN) 1 mg tablet Take 1 mg by mouth daily as needed for Anxiety. STOP taking these medications  
  
 cholecalciferol, vitamin D3, (Vitamin D3) 50 mcg (2,000 unit) tab Comments:  
Reason for Stopping:   
   
 levothyroxine (SYNTHROID) 50 mcg tablet Comments:  
Reason for Stopping:   
   
  
 
Activity: Activity as tolerated Diet: Comfort feeding Wound Care: None needed Follow-up with hospice in 1 day. Follow-up tests/labs - none Signed: 
Jabari Turner MD 
5/5/2021 
10:00 AM

## 2021-05-05 NOTE — PROGRESS NOTES
Bedside shift change report given to Alex (oncoming nurse) by Domingo (offgoing nurse). Report included the following information SBAR, Kardex, MAR and Recent Results.

## 2021-05-05 NOTE — DISCHARGE INSTRUCTIONS
Patient Discharge Instructions    Verna Tatum / [id number] : 1965    Admitted 2021 Discharged: 2021     Primary Diagnoses  Problem List as of 2021 Date Reviewed: 2020           Seizure ([facility]   Underweight   Hypothyroid   GERD (gastroesophageal reflux disease)   FTT (failure to thrive) in adult   Down's syndrome   Dementia ([facility]   Shock ([facility]   Sepsis ([facility]   Hypothermia   Bradycardia          Take Home Medications     · It is important that you take the medication exactly as they are prescribed. · Keep your medication in the bottles provided by the pharmacist and keep a list of the medication names, dosages, and times to be taken in your wallet. · Do not take other medications without consulting your doctor. What to do at [address]: Comfort feeding    Recommended activity: Activity as tolerated    If you experience worse symptoms, please follow up with hospice. Follow-up with your PCP in a few weeks    Patient Education        Hospice:  [facility] provides medical treatment to relieve symptoms at the end of life. The goal is to keep you comfortable, not to try to cure you. Hospice care does not speed up or lengthen dying. It focuses on easing pain and other symptoms. Hospice caregivers want to enhance your quality of life. Hospice care also offers emotional help and spiritual support when you are dying. It also helps family members care for a loved one who is dying. Hospice care can help you review your life, say important things to family and friends, and explore spiritual issues. Hospice also helps your family and friends deal with their grief after you die. You can use hospice care if your illness cannot be cured and doctors believe you have no more than 6 months to live. You do not need to be confined to a bed or in a hospital to benefit from this type of care.   The hospice team includes nurses, counselors, therapists, social workers, pastors, home health aides, and trained volunteers. You can get care in your own home or in a hospice center. Some hospice workers also go to nursing homes or hospitals. How can you care for yourself at home? · Prepare a list of advance directives. These are instructions to your doctor and family members about what kind of care you want if you become unable to speak or express yourself. · Find out if your health insurance covers hospice care. · Find hospice programs in your area. People who can help include your doctor, your state health department, and your insurance company. · Decide what kinds of hospice services you want. It helps to know what you want before you enter a hospice program.  · Think about some questions when preparing for hospice care. ? Who do you want to make decisions about your medical care if you are not able to? Many people choose their spouse, child, or doctor. ? What are you most afraid of that might happen? You might be afraid of having pain or losing your independence. Let your hospice team know your fears. The team can help you deal with them. ? Where would you prefer to die? Choices include your home, a hospital, or a nursing home. ? Do you want to donate organs when you die? Make sure that your family clearly understands this. ? Do you want any Rastafari rites or practices to be done before you die? Let your hospice team know what you want. Where can you learn more? Go to http://www.gray.com/  Enter H407 in the search box to learn more about \"Hospice: Care Instructions. \"  Current as of: July 17, 2020               Content Version: 12.8  © 1558-4953 [employer]. Care instructions adapted under license by [employer] (which disclaims liability or warranty for this information).  If you have questions about a medical condition or this instruction, always ask your healthcare professional. [employer] disclaims any warranty or liability for your use of this information. Information obtained by :  I understand that if any problems occur once I am at home I am to contact my physician. I understand and acknowledge receipt of the instructions indicated above. Physician's or R.N.'s Signature                                                                  Date/Time                                                                                                                                              Patient or Representative Signature                                                          Date/Time    [facility] Hospital/ED Visit Follow-Up Instructions/Information    You may have an in home follow up visit set up with [facility] or may wish to contact [facility] to set-up a visit:    What are we? [facility] is an in-home urgent care service staffed with emergency trained medical teams. We come to your home in a vehicle stocked with medical supplies and technology. An ER physician is always available if needed. When? As a part of your hospital follow-up, an appointment has been/ or can be set up for us to come see you. Usually, this will be 24-72 hours after you leave the hospital or as needed. [facility] is open 7am-9pm, 7 days a week, 365 days a year, including holidays. Why? We know that you cannot always get to your doctor after being in the hospital and that your doctor is not always available when you need them. Once your workup is complete, we'll call in your prescriptions, update your family doctor, and handle billing with your insurance so you can focus on feeling better, faster without leaving home. How much?   We accept most major health insurance plans, including Medicaid, Medicare, and Medicare Advantage [place], [facility], [employer], and [employer]. We also accept: credit, debit, health savings account (HSA), health reimbursement account (HRA) and flexible spending account (FSA) payments. [facility]'s prices compare to conventional urgent care facilities, but we bring the care to you. How to reach us? Getting care is easy- use our mobile sheila (CellTran), website ([email or web address]) or call us [phone].

## 2021-05-05 NOTE — PROGRESS NOTES
CMS Note  5/5/2021    Patient received their 1st IMM letter, patient was provided with a copy for their record.   Gianni Hernández CMS

## 2021-05-05 NOTE — PROGRESS NOTES
[address] [place] [address] [address] observed the patient was having seizure-like activity, ordered PRN 2 mg Ativan to be given.

## 2021-05-05 NOTE — PROGRESS NOTES
Physician Progress Note      Lizette Nieves  [facility] #:                  [id number]  :                       1965  ADMIT DATE:       2021 2:05 AM  DISCH DATE:  RESPONDING  PROVIDER #:        Ez Mehta MD          QUERY TEXT:    Pt admitted with Sepsis, Asp PNA and Seizures. Noted documentation in the H&P of \"AHRF: From what I can ascertain, he does not normally require oxygen. At present, he is needing 4Lnc. Imaging with atelectasis vs bilat pneumonia. \"    In order to support the diagnosis of acute respiratory failure, please include additional clinical indicators in your documentation. Or please document if the diagnosis of acute respiratory failure has been ruled out after further study. The medical record reflects the following:  Risk Factors: 59 yo M admitted with Sepsis, Asp PNA, FTT and seizures  Clinical Indicators: RR 20 O2 sats 96% , RR 25 with O2 sats 95% while on 2 L via NC  H&P states \"AHRF: From what I can ascertain, he does not normally require oxygen. At present, he is needing 4Lnc. Imaging with atelectasis vs bilat pneumonia. \"  Treatment: O2 @ 2-4L via NC    Acute Respiratory Failure Clinical Indicators per [employer]-DR[employer] Training Guide and Quick Reference Guide:  pO2 < 60 mmHg or SpO2 (pulse oximetry) < 91% breathing room air  pCO2 > 50 and pH < 7.35  P/F ratio (pO2 / FIO2) < 300  pO2 decrease or pCO2 increase by 10 mmHg from baseline (if known)  Supplemental oxygen of 40% or more  Presence of respiratory distress, tachypnea, dyspnea, shortness of breath, wheezing  Unable to speak in complete sentences  Use of accessory muscles to breathe  Extreme anxiety and feeling of impending doom  Tripod position  Confusion/altered mental status/obtunded  Options provided:  -- Acute Respiratory Failure as evidenced by, Please document evidence.   -- Acute Respiratory Failure ruled out after study  -- Other - I will add my own diagnosis  -- Disagree - Not applicable / Not valid  -- Disagree - Clinically unable to determine / Unknown  -- Refer to Clinical Documentation Reviewer    PROVIDER RESPONSE TEXT:    Acute Respiratory Failure has been ruled out after study.     Query created by: Dejon Washington on 5/4/2021 1:29 PM      Electronically signed by:  Emilie Kelly MD 5/5/2021 8:10 AM

## 2021-05-05 NOTE — PROGRESS NOTES
Bedside shift change report given to MEGHAN Parks (oncoming nurse) by Nicholas Harper (offgoing nurse). Report included the following information SBAR, Kardex, Procedure Summary, Intake/Output, MAR and Recent Results.

## 2021-05-05 NOTE — ACP (ADVANCE CARE PLANNING)
Primary Decision Maker (Active): Lorri Collet - Mother, Legal Guardian - [phone]    Secondary Decision Maker: Evi Melo - Father - [phone]  Advance Care Planning 5/4/2021   Patient's Healthcare Decision Maker is: Named in scanned ACP document   Confirm Advance Directive Yes, on file     Pt has AMD on file dated 3/28/2018 which appoints parents Xiomy Orellana and Gita Saavedra as primary and secondary Medical POAs in respective order. Pt currently has inpt DNR order in place, will need DDNR completed for discharge.

## 2021-05-05 NOTE — HOSPICE
[facility] Good Help to Those in Need 
[phone] Patient Name: Kyaw Nuñez YOB: 1965 Age: 60 y.o. [facility] RN Note:  Patient seen at bedside this morning. Initially appeared comfortable however on reassessment appx 45min later, patient was observed to be having signs of agitation and restlessness. Tremors throughout body  W/ some audible secretions noted. Possible seizure activity vs. Terminal agitation? Bedside RN notified and PRN dose of Ativan given Appears appropriate for inpatient admission for IV symptom management. Call to mother/mpoa to discuss. Hesitant to agree to inpatient admission until liaison could discuss w/ caregiver at group home. Mother is worried he is agitated because he wants to get out of bed. Explained to mother that he was not having any meaningful response or any sign that he would be able to get out of bed. Mother stated that she felt that was because we were keeping him too sedated. Educated on use of ativan to ensure comfort, prevent seizures and agitation and that only minimal doses of medications had been used. Mother would like to defer to caregiver at the group home's decision. She understands she will need to sign consents and is in agreement w/ whatever Aspen thinks. Attempted to call Americo Frank, caregiver at group home to discuss transition to inpatient hospice. Awaiting call back. I am concerned about the group home's ability to manage his symptoms and  that he may not survive transport to facility as he is showing signs of EOL. Will attempt to work w/ family to admit to GIP LOC at [facility].  
 
2414: Spoke w/ Aspen and Lorena Montero regarding POC for patient. Both willing and agreeable to inpatient hospice at [facility]. CM notified. Hospice MD aware,  Consents sent to [facility] via [employer]. Awaiting return. Will admit to hospice [facility] Thank you for the opportunity to be of service to this patient.

## 2021-05-05 NOTE — HOSPICE
[facility] Good Help to Those in Need 
[phone] Social Work Admission Note Patient Name: Jay La YOB: 1965 Age: 60 y.o. Date of Visit: 05/05/21 Facility of Care: [facility] Patient Room: 3/ Hospice Attending: Cristina Arteaga MD 
Hospice Diagnosis: Seizure disorder ([facility] [address] [T42.762] Level of Care:  
 [x]  GIP []  Respite 
 []  Routine NARRATIVE  
LCSW and RN Liaison met with patient and parents at bedside. Parents Winona Dancer and Tanner Trinidad shared that patient moved into group home 2 years ago this June after he stopped getting up to use the bathroom at home. Parents would visit him at the group home and often take him out for breakfast, but he declined significantly in the last year and no longer wanted to go out with them. Patient has 2 younger brothers both in [place] where the family grew up. Patient and parents moved to [place] 27 years ago. Patient has Down Syndrome and has been mostly nonverbal except when excited or in pain/discomfort. Mother expressed that looking at him, she expected him to just \"wake up. \" RN provided education regarding EOL process and gave her Gone From My Sight. Patient's father has health issues and was inpatient at [facility] for a few months at the beginning of the year. He has several follow up appts and they may not be able to visit again tomorrow. RN will plan to call with an update. LCSW discussed final arrangements with parents and they plan to pursue cremation but do not yet have a provider chosen. LCSW gave them a list of direct cremation providers to consider. LCSW will continue to follow. ADVANCE CARE PLANNING Code Status: DNR Durable DNR: X Yes  _ No 
Advance Care Planning 5/4/2021 Patient's Healthcare Decision Maker is: Named in scanned ACP document Confirm Advance Directive Yes, on file Relationship Status: 
[x]  Single    
[]       
[]     
[]  Domestic Partner    
[]  / 
[]  Common Law 
[]   
[]  Unknown If in a relationship, name of partner/spouse: 
Duration of relationship: 
 
Yazidism: NO Quaker  Home: Cremation, providers list given to parents and they will choose direct cremation provider Resources Provided: Supportive counseling at bedside, gone from my sight Social Work Initial Assessment Gender: 
male Race/Ethnicity: (chio all that apply) []  American [place] [facility]) or [place] Native 
[]   
[]  Black or [place] American 
[]   or  
[]   or Sharath 
[x]  Delwin Najjar 
[]  Unknown 
  
 Service:   
[]  Yes  
[x]  No      
[]  Unknown Appropriate for Pinning Ceremony:  
[]  Yes     
[x]  No 
Is patient using VA benefits? []  Yes     
[x]  No 
  
Primary Language: English 
[]   Needed 
[]   utilized during visit Ability to express thoughts/needs/feelings 
[]  Expressed thoughts/feelings/needs without difficulty 
[]  Requires extra time and cuing 
[]  Speech limited single words 
[]  Uses only gestures (eye, blinking eye or head movement/pointing) []  Unable to express thoughts/feelings/needs (speech unintelligible or inappropriate) [x]  Unresponsive Notes: Minimally responsive to mother's touch and voice 
  
Mental Status: 
[]  Alert-oriented to:   
 []  Person   
 []  Place   
 []  Time 
[]  Comatose-responds to:  
 []   Verbal stimuli  
 []  Tactile stimuli  
 []  Painful stimuli 
[]  Forgetful 
[]  Disoriented/Confused 
[x]  Lethargic [x]  Agitated 
[]  Other (specify):   
Notes:  
  
Patients description of Illness/Current Health Status:   
[x]  Patient unable to discuss 
[]  Patient unwilling to discuss 
[]  (Specify) Knowledge/Understanding of Disease Process Patient:  
 []  Demonstrates knowledge/understanding of disease process 
 []  Demonstrates knowledge/understanding of treatment plan 
 []  Demonstrates knowledge/understanding of prognosis  []  Demonstrates acceptance of prognosis []  Demonstrates knowledge/understanding of resuscitation status 
 []  Other (specify) Caregiver: 
 [x]  Demonstrates knowledge/understanding of disease process [x]  Demonstrates knowledge/understanding of treatment plan 
 [x]  Demonstrates knowledge/understanding of prognosis [x]  Demonstrates acceptance of prognosis []  Demonstrates knowledge/understanding of resuscitation status 
 []  Other (specify) Notes:  
  
Patients living arrangement/care setting: 
Use the PRIOR COLUMN when the PATIENTS current health status necessitated a change in his/her primary residence. Prior Current Response  
           []             []    Patients own home/residence []             []    Home of family member/friend [x]             []    Boarding home  
           []             []    Assisted living facility/assisted center []             []    Hospital/Acute care facility []             []    Skilled nursing facility []             []    Long term care facility/Nursing home  
           []             [x]    Hospice in Patient Primary Caregiver: 
[]  No Primary Caregiver Name of Primary Caregiver: Renee Avila Relationship or Primary Caregiver:  
 []  Spouse/Significant other     
 []  Natural Child      
 []  Step child     
 []  Sibling 
 [x]  Parent 
 []  Friend/Neighbor 
 []  Community/[employer] Volunteer 
 []  Paid help 
 []  Other (specify):___________ Notes:   
  
Family members/Significant others: 
Name: 
Relationship: 
Phone Number: Actively involved in care? []  Yes  []  No 
 
Name: 
Relationship: 
Phone Number: Actively involved in care? []  Yes  []  No 
 
Name: 
Relationship: 
Phone Number: Actively involved in care? []  Yes  []  No 
 
Social support systems: (select ONE best description) []  Excellent social support system which includes three or more family members or friends 
[x]  Good social support system which includes two or less members or friends 
[]  [place] support which includes one family member or friend 
[]  Poor social support; no family members or friends; basically ALONE Notes:  
  
Emotional Status: (chio all that apply) Patient Caregiver Response  
              []                [x]    Mood/Affect stable and appropriate    
              []                []    Angry  
              []                []    Anxious []                []    Apprehensive []                []    Avoidant  
              []                []    Clinging  
              []                []    Depressed  
              []                []    Distraught  
              []                []    Elated []                []    Euphoric  
              []                []    Fearful  
              []                []    Flat Affect  
              []                []    Helpless []                []    Hostile []                []    Impulsive []                []    Irritable  
              []                []    Labile  
              []                []    Manic  
              []                []    Restlessness []                []    Sad  
              []                []    Suspicious []                []    Tearful  
              []                []    Withdrawn Notes:  
 
Coping Skills (strengths/weakness):  
 Patient: Coping Skills (strength/weakness):  
 Family/caregiver (strength/weakness): 
  
[place] of care (chio all that apply):    
[x]  No burden evident  
[]  Family must administer medications  
[]  Illness causing financial strain  
[]  Family/Support feels overwhelmed  
[]  Family/Support sleep disturbed with patients care  
[]  Patients care causes extra physical stress  of death 
[]  Illness causes changes in family lifestyle 
[]  Illness impacting family/support employment 
[]  Family experiencing increased time demands 
[]  Patients behavior endangers family 
[]  Denial of patients illness 
[]  Concern over outcome of illness/fear 
[]  Patients behavior embarrassing to family Notes:  
  
Risk Factors: (chio all that apply):   
[x]  No burden evident  
[]  Alcohol abuse 
[]  Financial resources inadequate to meet basic needs (food/house/etc) []  Financial resources inadequate to meet health care needs (supplies/equipment/medications) 
[]  Food/nutrition resources inadequate 
[]  Home environment unsafe/inadequate for home care 
[]  Homicidal risk 
[]  Lives alone or without concerned relatives 
[]  Multiple medications/complex schedule 
[]  Physical limitations increase likelihood of falls 
[]  Plan of care/treatments complicated 
[]  Substance use/abuse 
[]  Suicidal risk 
[]  Visual impairment threatens safety/ability to perform self-care 
[]  Other (specify): 
  
Abuse/Neglect (actual/potential risks): 
[x]  No signs of abuse/neglect 
[]  History of abuse/neglect                 []  MIWOVSOU          []  Sexual 
[]  History of domestic violence 
[]  Lacks adequate physical care 
[]  Lacks emotional nurturing/support 
[]  Lacks appropriate stimulation/cognitive experiences 
[]  Left alone inappropriately 
[]  Lacks necessary supervision 
[]  Inadequate or delayed medical care 
[]  Unsafe environment (i.e guns/drug use/history of violence in the home/etc.) []  Bruising or other physical signs of injury present 
[]  Other (specify): 
Notes:  
[]  Refer to child/adult protective services Current Sources of Stress (in Addition to Current Illness):  
[x]  None reported 
[]  Bills/Debt   
[]  Career/Job change   
[]   (short term)   
[]   (long term)   
[]  Death of a child (recent)   
[]  Death of a parent (recent)  
[]  Death of a spouse (recent)  
[]  Employment status changed  
[]  Family discord   
[]  Financial loss/Inadequate inther (specify):come 
[]  Job loss 
[]  Legal issues unresolved 
[]  Lifestyle change 
[]  Marital discord 
[]  Marriage within the last year 
[]  Paperwork (insurance/legal/etc) overwhelming 
[]  Separation/Divorce 
[]  Other (specify): 
Notes:  
  
Current [employer] Being Utilized 1. [facility] care Interventions/Plan of Care 1. Assess social and emotional factors related to coping with end of life issues 2. Community resource planning/referral  
3. Relocation to different care setting if/when symptoms stabilize Discharge Planning 1. Should patient stabilize, will dc back to group home MSW Assessment Completed by: Micheal Hernández 05/05/21 Time In: 300 Time Out: 345

## 2021-05-05 NOTE — PROGRESS NOTES
[facility] Medical Progress Note NAME: Mellissa Jarrett :  1965 MRM:  [id number] Date/Time of service 2021  9:59 AM    
 
  
Assessment and Plan:  
 
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  Resolved. Consulted neurology. Continue lamictal. Mother think this is causing excessive lethargy, and would consider another agent. She is realistic that he is near death and wants to focus on comfort. Aspiration PNA - POA, presumed, due to sz. Check MRSA, started Zosyn, but at mother's request we have converted to comfort measures and stopped Abx. Sepsis / Hypothermia / Bradycardia / Shock - Worsening after admit. May be due to PNA, vs overall condition. Discussed at length with his mother, YOLANDA. No agressive measures. DNR. No pressors or central line. Hospice consult. FTT (failure to thrive) in adult / Underweight - worsening over years. Nutrition support when eating. Mother would like him to be at [address] [place] [address], will DC on hospice tomorrow Hypothyroid - hold homeopathic dose GERD (gastroesophageal reflux disease) - PPI for comfort Subjective: Chief Complaint:  somnolent ROS: 
(bold if positive, if negative) Not Tolerating PT  Not Tolerating Diet Objective:  
 
Last 24hrs VS reviewed since prior progress note. Most recent are: 
 
Visit Vitals BP (!) 93/57 (BP 1 Location: Right upper arm, BP Patient Position: At rest) Pulse 77 Temp 100 °F (37.8 °C) Resp 12 Wt 55.9 kg (123 lb 3.2 oz) SpO2 91% BMI 20.50 kg/m² SpO2 Readings from Last 6 Encounters:  
21 91% 21 100% 10/15/20 100% O2 Flow Rate (L/min): 3 l/min Intake/Output Summary (Last 24 hours) at 2021 8005 Last data filed at 2021 5843 Gross per 24 hour Intake 0 ml Output  Net 0 ml Physical Exam: 
 
Gen:  Frai, thin ill appearing, in no acute distress HEENT:  Pink conjunctivae, PERRL, hearing intact to voice, moist mucous membranes Neck:  Supple, without masses, thyroid non-tender Resp:  No accessory muscle use, clear breath sounds without wheezes rales or rhonchi 
Card:  No murmurs, normal S1, S2 without thrills, bruits or peripheral edema Abd:  Soft, non-tender, non-distended, normoactive bowel sounds are present, no mass Lymph:  No cervical or inguinal adenopathy Musc:  No cyanosis or clubbing Skin:  No rashes or ulcers, skin turgor is reduced Neuro:  Cranial nerves are grossly intact, genera motor weakness, does not follow commands Psych:  somnolent Telemetry reviewed:   normal sinus rhythm 
__________________________________________________________________ Medications Reviewed: (see below) Medications:  
 
Current Facility-Administered Medications Medication Dose Route Frequency  sodium chloride (NS) flush 5-10 mL  5-10 mL IntraVENous PRN  
 sodium chloride (NS) flush 5-40 mL  5-40 mL IntraVENous PRN  
 acetaminophen (TYLENOL) tablet 650 mg  650 mg Oral Q6H PRN Or  
 acetaminophen (TYLENOL) suppository 650 mg  650 mg Rectal Q6H PRN  
 ondansetron (ZOFRAN) injection 4 mg  4 mg IntraVENous Q6H PRN  
 morphine injection 1 mg  1 mg IntraVENous Q15MIN PRN  
 LORazepam (ATIVAN) injection 2 mg  2 mg IntraVENous Q5MIN PRN  
 LORazepam (ATIVAN) injection 1 mg  1 mg IntraVENous Q1H PRN  
 LORazepam (ATIVAN) injection 1 mg  1 mg IntraVENous Q8H  
 ketorolac (TORADOL) injection 30 mg  30 mg IntraVENous Q8H PRN Lab Data Reviewed: (see below) Lab Review:  
 
Recent Labs 05/04/21 
0236 WBC 8.9 HGB 13.9 HCT 41.1  Recent Labs 05/04/21 
0236   
K 3.7  CO2 28 * BUN 18 CREA 0.96  
CA 8.8 ALB 3.2* TBILI 0.2 ALT 17 No results found for: Jasmine Multani No results for input(s): PH, PCO2, PO2, HCO3, FIO2 in the last 72 hours. No results for input(s): INR, INREXT, INREXT in the last 72 hours. All Micro Results  Procedure Component Value Units Date/Time CULTURE, BLOOD [[id number]] Collected: 05/04/21 0236 Order Status: Completed Specimen: Blood Updated: 05/05/21 0533 Special Requests: NO SPECIAL REQUESTS Culture result: NO GROWTH 1 DAY     
 CULTURE, MRSA [[id number]] Order Status: Canceled Specimen: Nasal from Nares CULTURE, BLOOD [[id number]] Collected: 05/04/21 0236 Order Status: Completed Specimen: Blood Updated: 05/04/21 0243 Other pertinent lab: none Total time spent with patient: 28 Minutes  I personally reviewed chart, notes, data and current medications in the medical record. I have personally examined and treated the patient at bedside during this period. Care Plan discussed with: Patient, Family, Care Manager, Nursing Staff, Consultant/Specialist and >50% of time spent in counseling and coordination of care Discussed:  Code Status, Care Plan and D/C Planning Prophylaxis:  SCD's and H2B/PPI Disposition:  Home w/Family 
        
___________________________________________________ Attending Physician: Alexa London MD

## 2021-05-06 NOTE — HOSPICE
[facility] Good Help to Those in Need 
[phone] Discharge/Death Nursing Note Patient Name: Lucy Virgen YOB: 1965 Age: 60 y.o. Date of Death: 21 Admitted Date: 2021 Time of Death: 2314 Facility of Care: [facility] Level of Care: GIP Patient Room: [facility] Hospice Attending: No att. providers found Hospice Diagnosis: Seizure disorder ([facility] [address] [R33.255] Death Pronouncement Pronouncement of death completed by: Dr. Rolm Baumgarten Agency staff was not present at the time of death At the time of death the patient was documented as unresponsive to verbal, tactile and noxious stimuli, pupils were bilaterally dilated, non-responsive to light, no bilateral conjunctival reflex present, no heart sounds auscultated, no spontaneous breath sounds The pt  within [facility] The following were notified of the patient's death: hospice intake, MD 
 
Medications were disposed of per facility protocol Discharge Summary Discharge Reason: Death Summary of Care Provided: 
 
[x] Post mortem care provided by bedside rn 
[x] Notification of  home by nursing supervisor 
[] Referrals/Community resources provided:  
[] Goals completed 
[] Durable Medical Equipment vendor notified Disciplines involved: [x] RN [] SW [x]  [] NICOLAS [] Vol [] PT [] OT [] ST [] BC 
 
[x] IDT communication/notification Attending Physician, Dr. Amairani Pablo, notified of death Bereaved Verify bereaved identified with name, address, telephone number and risk level Advance Care Planning 2021 Patient's Healthcare Decision Maker is: Named in scanned ACP document Confirm Advance Directive Yes, on file

## 2021-05-06 NOTE — PROGRESS NOTES
2300- During patient hourly round patient was found unresponsive to stimuli and not breathing. I got another nurse to assess the patient and confirmed that he was not breathing and unresponsive. 230- Notified Dr. Rojelio Chandler to come and prounouce the patient time of death. Also notified nursing supervisor and Shayna Hayes, and hospice team notified. 2315- time of death per MD 
 
[phone] Called [facility] and gave permission to release the body to the  home. 0000- Postmortem care provided. Will notify nursing supervisor.

## 2021-05-06 NOTE — PROGRESS NOTES
DEATH PRONOUNCEMENT NOTE Called by nurse to pronounce the patient's death. Patient seen at bedside. On examination, patient was unresponsive to verbal, tactile and noxious stimuli, pupils were bilaterally dilated, non-responsive to light, no bilateral conjunctival reflex present, no heart sounds auscultated, no spontaneous breath sounds present, no peripheral pulses present. Patient pronounced dead at 11:14 pm on 5/5/2021. Nurse will inform the family and the attending.

## 2021-05-06 NOTE — PROGRESS NOTES
1100-During hourly patients rounds, found patient not breathing and not responsive to stimuli. Rn got second nurse to  Assess patient and confirmed the patient was not breathing and unresponsive.  
 
[phone]

## 2021-05-07 NOTE — DISCHARGE SUMMARY
Discharge Summary [facility] Good Help to Those in Need 
[phone] Date of Admission: 5/5/2021 Date of Discharge: 5/5/2021 Matthias Paz is a 60y.o. year old who was admitted to [facility] at [facility] with a Hospice diagnosis of Seizure disorder ([facility] [address] [G40.909]. Pt was admitted for [facility] level care. Per HPI Pt resident of a group home, with a past history of Downs Syndrome, Intellectual disabilities, Alzheimer's Dementia and seizures. He came into hospital after being found unresponsive, likely post ictal. His parents and staff from the facility report pt has had a functional decline over past 6 months, limited standing and now longer feeding self. Patient has remained minimally responsive and now having more breakthrough seizures, restlessness, observed to have increased oropharyngeal secretions with labored breathing. Patient is dependent for all ADLs PPS is now 20% 
  The patient/family chose comfort measures with the support of Hospice. 
  
 HOSPICE DIAGNOSES Active Symptoms: 1. Restlessness/agitation/anxiety 2. Seizure like activity 3. Labored breathing 4. Increased oropharyngeal secretions 5. Non verbal pain 6. Decreased responsiveness 7. Hospice care pt 
  
 PLAN 1. Admit to [facility] as pt has declined significantly, is hemodynamically unstable and unresponsive, needs close monitoring and frequent medication administration and titration 2. Robinul 0.2mg IV scheduled every 4 hours and haldol low dose 0.5mg IV every 4 hours as needed for intractable secretions 3. Ativan 1mg IV scheduled every 4 hours and every 15mts as needed 4. Seizure dose of ativan also added as needed if pt actively seizing 5. Morphine 2mg IV scheduled every 4 hours and every 15mts as needed 6. Other comfort meds as needed 
  
7.  and SW to support family needs 8.  Disposition: if pt stabilizes then may be able to return to Group home with hospice care, unlikely as close to end 
9. Hospice Plan of care was reviewed in detail and agree with current plan of care The patient's care was focused on comfort and the patient passed away on 5/5/2021.

## 2021-05-07 NOTE — HOSPICE
[address] Bereavement/Condolence Call: This MSW called pts mother to offer condolences and support. MSW offered [address] information for ongoing support. Mother stated she is on the way to [address] [place] [address] to pay for cremation and that family is holding up well. JOSÉ ANTONIO Ron, [facility]  [phone]

## 2021-05-10 LAB
BACTERIA SPEC CULT: NORMAL
BACTERIA SPEC CULT: NORMAL
SERVICE CMNT-IMP: NORMAL
SERVICE CMNT-IMP: NORMAL

## 2025-01-04 NOTE — DISCHARGE INSTRUCTIONS
Please take antibiotics for your pneumonia. Check your oxygen saturation 3 times a day. Return if your symptoms worsen or your oxygen levels fall below 90%. Thank you. 2-4 weeks